# Patient Record
Sex: FEMALE | Race: WHITE | NOT HISPANIC OR LATINO | Employment: FULL TIME | URBAN - METROPOLITAN AREA
[De-identification: names, ages, dates, MRNs, and addresses within clinical notes are randomized per-mention and may not be internally consistent; named-entity substitution may affect disease eponyms.]

---

## 2024-06-04 ENCOUNTER — OFFICE VISIT (OUTPATIENT)
Dept: SURGERY | Facility: CLINIC | Age: 24
End: 2024-06-04
Payer: COMMERCIAL

## 2024-06-04 VITALS
SYSTOLIC BLOOD PRESSURE: 115 MMHG | WEIGHT: 200 LBS | HEART RATE: 72 BPM | RESPIRATION RATE: 16 BRPM | DIASTOLIC BLOOD PRESSURE: 73 MMHG | HEIGHT: 63 IN | BODY MASS INDEX: 35.44 KG/M2

## 2024-06-04 DIAGNOSIS — L08.9 INFECTED SEBACEOUS CYST: Primary | ICD-10-CM

## 2024-06-04 DIAGNOSIS — L72.3 INFECTED SEBACEOUS CYST: Primary | ICD-10-CM

## 2024-06-04 PROCEDURE — 87205 SMEAR GRAM STAIN: CPT | Performed by: SURGERY

## 2024-06-04 PROCEDURE — 87070 CULTURE OTHR SPECIMN AEROBIC: CPT | Performed by: SURGERY

## 2024-06-04 PROCEDURE — 10060 I&D ABSCESS SIMPLE/SINGLE: CPT | Performed by: SURGERY

## 2024-06-04 PROCEDURE — 87075 CULTR BACTERIA EXCEPT BLOOD: CPT | Performed by: SURGERY

## 2024-06-04 PROCEDURE — 87076 CULTURE ANAEROBE IDENT EACH: CPT | Performed by: SURGERY

## 2024-06-04 NOTE — PROGRESS NOTES
Preoperative diagnosis:  Diagnoses and all orders for this visit:    1. Infected sebaceous cyst (Primary)         Postoperative diagnosis:  Diagnoses and all orders for this visit:    1. Infected sebaceous cyst (Primary)         Procedure:  Incision and drainage     Surgeon:  Fam Cornelius M.D.     Anesthesia:  2 ml  Lidocaine     Assistant:  Lidia GALDAMEZ      EBL:  Minimal     Complications:  None     Specimen:  Fluid for cultures     Indications: 24 y.o. female referred by CLIFF Edouard after she was seen in a urgent care center for a painful, red, swollen area at her left upper chest.  She has  always had a small cyst at this area.  The changes mentioned occurred over the past few weeks.  She was placed on antibiotics and there was no improvement.  On exam, there is swelling, erythema, tenderness of the left upper chest.  Exam characteristics consistent with an infected sebaceous cyst.    Findings:  Purulent material drained from the skin and subcutaneous tissue.  Cyst capsule along with cyst contents was extracted.     Technique: Patient was taken to the procedure room and placed appropriately on the procedure table.  Consent had already been obtained.  The left upper chest at the area of concern was prepped and draped in usual fashion.  3 mL of lidocaine was injected into the skin.  An 11 blade knife was used to fashion an incision about one cm in length and about a small amount of purulent material drained.  A sample of the fluid was collected and sent for cultures.  I was able to extract the cyst along with the cyst capsule as well.  The wound was irrigated and an appropriate dressing was placed.  Adequate hemostasis.  No complications.     Follow-up:  Wound care instructions were provided verbally.  Patient will follow-up with me PRN.      Fam Cornelius MD  06/04/2024    Electronically signed by Fam Cornelius MD, 06/04/24, 10:07 AM EDT.

## 2024-06-07 LAB
BACTERIA SPEC AEROBE CULT: NORMAL
GRAM STN SPEC: NORMAL
GRAM STN SPEC: NORMAL

## 2024-06-09 LAB — BACTERIA SPEC ANAEROBE CULT: ABNORMAL

## 2025-03-14 ENCOUNTER — ANESTHESIA (OUTPATIENT)
Dept: PERIOP | Facility: HOSPITAL | Age: 25
End: 2025-03-14
Payer: COMMERCIAL

## 2025-03-14 ENCOUNTER — ANESTHESIA EVENT (OUTPATIENT)
Dept: PERIOP | Facility: HOSPITAL | Age: 25
End: 2025-03-14
Payer: COMMERCIAL

## 2025-03-14 ENCOUNTER — HOSPITAL ENCOUNTER (OUTPATIENT)
Facility: HOSPITAL | Age: 25
Discharge: HOME | End: 2025-03-16
Attending: INTERNAL MEDICINE | Admitting: SURGERY
Payer: COMMERCIAL

## 2025-03-14 DIAGNOSIS — K81.9 CHOLECYSTITIS: Primary | ICD-10-CM

## 2025-03-14 LAB
ABO GROUP BLD: NORMAL
ABO GROUP BLD: NORMAL
ALBUMIN SERPL-MCNC: 3.7 G/DL (ref 3.5–5.2)
ALBUMIN/GLOB SERPL: 1 G/DL
ALP SERPL-CCNC: 71 U/L (ref 39–117)
ALT SERPL W P-5'-P-CCNC: 19 U/L (ref 1–33)
ANION GAP SERPL CALCULATED.3IONS-SCNC: 9.4 MMOL/L (ref 5–15)
APTT PPP: 28.9 SECONDS (ref 24.2–34.2)
AST SERPL-CCNC: 19 U/L (ref 1–32)
B-HCG UR QL: NEGATIVE
BACTERIA UR QL AUTO: ABNORMAL /HPF
BASOPHILS # BLD AUTO: 0.03 10*3/MM3 (ref 0–0.2)
BASOPHILS NFR BLD AUTO: 0.3 % (ref 0–1.5)
BILIRUB SERPL-MCNC: 0.6 MG/DL (ref 0–1.2)
BILIRUB UR QL STRIP: NEGATIVE
BLD GP AB SCN SERPL QL: NEGATIVE
BUN SERPL-MCNC: 8 MG/DL (ref 6–20)
BUN/CREAT SERPL: 12.9 (ref 7–25)
CALCIUM SPEC-SCNC: 8.9 MG/DL (ref 8.6–10.5)
CHLORIDE SERPL-SCNC: 105 MMOL/L (ref 98–107)
CLARITY UR: CLEAR
CO2 SERPL-SCNC: 23.6 MMOL/L (ref 22–29)
COLOR UR: YELLOW
CREAT SERPL-MCNC: 0.62 MG/DL (ref 0.57–1)
D-LACTATE SERPL-SCNC: 0.6 MMOL/L (ref 0.5–2)
DEPRECATED RDW RBC AUTO: 40.9 FL (ref 37–54)
EGFRCR SERPLBLD CKD-EPI 2021: 126.9 ML/MIN/1.73
EOSINOPHIL # BLD AUTO: 0.1 10*3/MM3 (ref 0–0.4)
EOSINOPHIL NFR BLD AUTO: 0.9 % (ref 0.3–6.2)
ERYTHROCYTE [DISTWIDTH] IN BLOOD BY AUTOMATED COUNT: 12.7 % (ref 12.3–15.4)
GLOBULIN UR ELPH-MCNC: 3.6 GM/DL
GLUCOSE SERPL-MCNC: 81 MG/DL (ref 65–99)
GLUCOSE UR STRIP-MCNC: NEGATIVE MG/DL
HCT VFR BLD AUTO: 37 % (ref 34–46.6)
HGB BLD-MCNC: 12.2 G/DL (ref 12–15.9)
HGB UR QL STRIP.AUTO: NEGATIVE
HOLD SPECIMEN: NORMAL
HYALINE CASTS UR QL AUTO: ABNORMAL /LPF
IMM GRANULOCYTES # BLD AUTO: 0.03 10*3/MM3 (ref 0–0.05)
IMM GRANULOCYTES NFR BLD AUTO: 0.3 % (ref 0–0.5)
INR PPP: 1.05 (ref 0.86–1.15)
KETONES UR QL STRIP: ABNORMAL
LEUKOCYTE ESTERASE UR QL STRIP.AUTO: ABNORMAL
LIPASE SERPL-CCNC: 18 U/L (ref 13–60)
LYMPHOCYTES # BLD AUTO: 2.67 10*3/MM3 (ref 0.7–3.1)
LYMPHOCYTES NFR BLD AUTO: 22.8 % (ref 19.6–45.3)
MAGNESIUM SERPL-MCNC: 1.7 MG/DL (ref 1.6–2.6)
MCH RBC QN AUTO: 29 PG (ref 26.6–33)
MCHC RBC AUTO-ENTMCNC: 33 G/DL (ref 31.5–35.7)
MCV RBC AUTO: 87.9 FL (ref 79–97)
MONOCYTES # BLD AUTO: 0.97 10*3/MM3 (ref 0.1–0.9)
MONOCYTES NFR BLD AUTO: 8.3 % (ref 5–12)
NEUTROPHILS NFR BLD AUTO: 67.4 % (ref 42.7–76)
NEUTROPHILS NFR BLD AUTO: 7.93 10*3/MM3 (ref 1.7–7)
NITRITE UR QL STRIP: NEGATIVE
NRBC BLD AUTO-RTO: 0 /100 WBC (ref 0–0.2)
PH UR STRIP.AUTO: 7 [PH] (ref 5–8)
PHOSPHATE SERPL-MCNC: 2.9 MG/DL (ref 2.5–4.5)
PLATELET # BLD AUTO: 234 10*3/MM3 (ref 140–450)
PMV BLD AUTO: 9.9 FL (ref 6–12)
POTASSIUM SERPL-SCNC: 3.5 MMOL/L (ref 3.5–5.2)
PROT SERPL-MCNC: 7.3 G/DL (ref 6–8.5)
PROT UR QL STRIP: NEGATIVE
PROTHROMBIN TIME: 14.2 SECONDS (ref 11.8–14.9)
RBC # BLD AUTO: 4.21 10*6/MM3 (ref 3.77–5.28)
RBC # UR STRIP: ABNORMAL /HPF
REF LAB TEST METHOD: ABNORMAL
RH BLD: NEGATIVE
RH BLD: NEGATIVE
SODIUM SERPL-SCNC: 138 MMOL/L (ref 136–145)
SP GR UR STRIP: 1.02 (ref 1–1.03)
SQUAMOUS #/AREA URNS HPF: ABNORMAL /HPF
T&S EXPIRATION DATE: NORMAL
UROBILINOGEN UR QL STRIP: ABNORMAL
WBC # UR STRIP: ABNORMAL /HPF
WBC NRBC COR # BLD AUTO: 11.73 10*3/MM3 (ref 3.4–10.8)

## 2025-03-14 PROCEDURE — 88304 TISSUE EXAM BY PATHOLOGIST: CPT | Performed by: SURGERY

## 2025-03-14 PROCEDURE — 25010000002 INDOCYANINE GREEN 25 MG RECONSTITUTED SOLUTION: Performed by: SURGERY

## 2025-03-14 PROCEDURE — 83605 ASSAY OF LACTIC ACID: CPT | Performed by: HOSPITALIST

## 2025-03-14 PROCEDURE — 25010000002 PROPOFOL 10 MG/ML EMULSION: Performed by: NURSE ANESTHETIST, CERTIFIED REGISTERED

## 2025-03-14 PROCEDURE — 84100 ASSAY OF PHOSPHORUS: CPT | Performed by: HOSPITALIST

## 2025-03-14 PROCEDURE — 25010000002 LIDOCAINE PF 2% 2 % SOLUTION: Performed by: NURSE ANESTHETIST, CERTIFIED REGISTERED

## 2025-03-14 PROCEDURE — 83735 ASSAY OF MAGNESIUM: CPT | Performed by: HOSPITALIST

## 2025-03-14 PROCEDURE — 25010000002 FENTANYL CITRATE (PF) 50 MCG/ML SOLUTION: Performed by: NURSE ANESTHETIST, CERTIFIED REGISTERED

## 2025-03-14 PROCEDURE — G0378 HOSPITAL OBSERVATION PER HR: HCPCS

## 2025-03-14 PROCEDURE — 99204 OFFICE O/P NEW MOD 45 MIN: CPT | Performed by: SURGERY

## 2025-03-14 PROCEDURE — 25010000002 CEFOXITIN PER 1 G: Performed by: SURGERY

## 2025-03-14 PROCEDURE — 86901 BLOOD TYPING SEROLOGIC RH(D): CPT | Performed by: HOSPITALIST

## 2025-03-14 PROCEDURE — 86850 RBC ANTIBODY SCREEN: CPT | Performed by: HOSPITALIST

## 2025-03-14 PROCEDURE — 25810000003 LACTATED RINGERS PER 1000 ML: Performed by: ANESTHESIOLOGY

## 2025-03-14 PROCEDURE — 25010000002 MIDAZOLAM PER 1MG: Performed by: ANESTHESIOLOGY

## 2025-03-14 PROCEDURE — 81001 URINALYSIS AUTO W/SCOPE: CPT | Performed by: HOSPITALIST

## 2025-03-14 PROCEDURE — 85025 COMPLETE CBC W/AUTO DIFF WBC: CPT | Performed by: HOSPITALIST

## 2025-03-14 PROCEDURE — 83690 ASSAY OF LIPASE: CPT | Performed by: HOSPITALIST

## 2025-03-14 PROCEDURE — 81025 URINE PREGNANCY TEST: CPT | Performed by: SURGERY

## 2025-03-14 PROCEDURE — 85730 THROMBOPLASTIN TIME PARTIAL: CPT | Performed by: HOSPITALIST

## 2025-03-14 PROCEDURE — 25010000002 CEFAZOLIN PER 500 MG: Performed by: NURSE ANESTHETIST, CERTIFIED REGISTERED

## 2025-03-14 PROCEDURE — 86900 BLOOD TYPING SEROLOGIC ABO: CPT

## 2025-03-14 PROCEDURE — 85610 PROTHROMBIN TIME: CPT | Performed by: HOSPITALIST

## 2025-03-14 PROCEDURE — 86901 BLOOD TYPING SEROLOGIC RH(D): CPT

## 2025-03-14 PROCEDURE — 80053 COMPREHEN METABOLIC PANEL: CPT | Performed by: HOSPITALIST

## 2025-03-14 PROCEDURE — 47563 LAPARO CHOLECYSTECTOMY/GRAPH: CPT | Performed by: SURGERY

## 2025-03-14 PROCEDURE — 25010000002 KETOROLAC TROMETHAMINE PER 15 MG: Performed by: NURSE ANESTHETIST, CERTIFIED REGISTERED

## 2025-03-14 PROCEDURE — 25010000002 DEXAMETHASONE PER 1 MG: Performed by: NURSE ANESTHETIST, CERTIFIED REGISTERED

## 2025-03-14 PROCEDURE — 25010000002 SUGAMMADEX 200 MG/2ML SOLUTION: Performed by: NURSE ANESTHETIST, CERTIFIED REGISTERED

## 2025-03-14 PROCEDURE — 25010000002 HYDROMORPHONE 1 MG/ML SOLUTION: Performed by: NURSE ANESTHETIST, CERTIFIED REGISTERED

## 2025-03-14 PROCEDURE — 94799 UNLISTED PULMONARY SVC/PX: CPT

## 2025-03-14 PROCEDURE — 86900 BLOOD TYPING SEROLOGIC ABO: CPT | Performed by: HOSPITALIST

## 2025-03-14 PROCEDURE — 25010000002 BUPIVACAINE (PF) 0.25 % SOLUTION: Performed by: SURGERY

## 2025-03-14 PROCEDURE — 25010000002 ONDANSETRON PER 1 MG: Performed by: NURSE ANESTHETIST, CERTIFIED REGISTERED

## 2025-03-14 DEVICE — MEDIUM-LARGE LIGATION CLIPS 6 CLIPS/CART
Type: IMPLANTABLE DEVICE | Site: ABDOMEN | Status: FUNCTIONAL
Brand: VAS-Q-CLIP

## 2025-03-14 RX ORDER — MIDAZOLAM HYDROCHLORIDE 2 MG/2ML
2 INJECTION, SOLUTION INTRAMUSCULAR; INTRAVENOUS ONCE
Status: COMPLETED | OUTPATIENT
Start: 2025-03-14 | End: 2025-03-14

## 2025-03-14 RX ORDER — SODIUM CHLORIDE 0.9 % (FLUSH) 0.9 %
10 SYRINGE (ML) INJECTION EVERY 12 HOURS SCHEDULED
Status: DISCONTINUED | OUTPATIENT
Start: 2025-03-14 | End: 2025-03-16 | Stop reason: HOSPADM

## 2025-03-14 RX ORDER — ACETAMINOPHEN 160 MG/5ML
650 SOLUTION ORAL EVERY 4 HOURS PRN
Status: DISCONTINUED | OUTPATIENT
Start: 2025-03-14 | End: 2025-03-16 | Stop reason: HOSPADM

## 2025-03-14 RX ORDER — SODIUM CHLORIDE, SODIUM LACTATE, POTASSIUM CHLORIDE, CALCIUM CHLORIDE 600; 310; 30; 20 MG/100ML; MG/100ML; MG/100ML; MG/100ML
100 INJECTION, SOLUTION INTRAVENOUS CONTINUOUS
Status: DISCONTINUED | OUTPATIENT
Start: 2025-03-14 | End: 2025-03-16 | Stop reason: HOSPADM

## 2025-03-14 RX ORDER — AMOXICILLIN 250 MG
2 CAPSULE ORAL 2 TIMES DAILY PRN
Status: DISCONTINUED | OUTPATIENT
Start: 2025-03-14 | End: 2025-03-15

## 2025-03-14 RX ORDER — SODIUM CHLORIDE 0.9 % (FLUSH) 0.9 %
10 SYRINGE (ML) INJECTION AS NEEDED
Status: DISCONTINUED | OUTPATIENT
Start: 2025-03-14 | End: 2025-03-14 | Stop reason: HOSPADM

## 2025-03-14 RX ORDER — BISACODYL 10 MG
10 SUPPOSITORY, RECTAL RECTAL DAILY PRN
Status: DISCONTINUED | OUTPATIENT
Start: 2025-03-14 | End: 2025-03-15

## 2025-03-14 RX ORDER — INDOCYANINE GREEN AND WATER 25 MG
2.5 KIT INJECTION ONCE
Status: COMPLETED | OUTPATIENT
Start: 2025-03-14 | End: 2025-03-14

## 2025-03-14 RX ORDER — CEFAZOLIN SODIUM 1 G/3ML
INJECTION, POWDER, FOR SOLUTION INTRAMUSCULAR; INTRAVENOUS AS NEEDED
Status: DISCONTINUED | OUTPATIENT
Start: 2025-03-14 | End: 2025-03-14 | Stop reason: SURG

## 2025-03-14 RX ORDER — DEXAMETHASONE SODIUM PHOSPHATE 4 MG/ML
INJECTION, SOLUTION INTRA-ARTICULAR; INTRALESIONAL; INTRAMUSCULAR; INTRAVENOUS; SOFT TISSUE AS NEEDED
Status: DISCONTINUED | OUTPATIENT
Start: 2025-03-14 | End: 2025-03-14 | Stop reason: SURG

## 2025-03-14 RX ORDER — LIDOCAINE HYDROCHLORIDE 20 MG/ML
INJECTION, SOLUTION EPIDURAL; INFILTRATION; INTRACAUDAL; PERINEURAL AS NEEDED
Status: DISCONTINUED | OUTPATIENT
Start: 2025-03-14 | End: 2025-03-14 | Stop reason: SURG

## 2025-03-14 RX ORDER — SODIUM CHLORIDE, SODIUM LACTATE, POTASSIUM CHLORIDE, CALCIUM CHLORIDE 600; 310; 30; 20 MG/100ML; MG/100ML; MG/100ML; MG/100ML
9 INJECTION, SOLUTION INTRAVENOUS CONTINUOUS PRN
Status: ACTIVE | OUTPATIENT
Start: 2025-03-14 | End: 2025-03-15

## 2025-03-14 RX ORDER — ONDANSETRON 2 MG/ML
INJECTION INTRAMUSCULAR; INTRAVENOUS AS NEEDED
Status: DISCONTINUED | OUTPATIENT
Start: 2025-03-14 | End: 2025-03-14 | Stop reason: SURG

## 2025-03-14 RX ORDER — ONDANSETRON 2 MG/ML
4 INJECTION INTRAMUSCULAR; INTRAVENOUS EVERY 6 HOURS PRN
Status: DISCONTINUED | OUTPATIENT
Start: 2025-03-14 | End: 2025-03-14 | Stop reason: SDUPTHER

## 2025-03-14 RX ORDER — NALOXONE HCL 0.4 MG/ML
0.4 VIAL (ML) INJECTION
Status: DISCONTINUED | OUTPATIENT
Start: 2025-03-14 | End: 2025-03-16 | Stop reason: HOSPADM

## 2025-03-14 RX ORDER — ACETAMINOPHEN 650 MG/1
650 SUPPOSITORY RECTAL EVERY 4 HOURS PRN
Status: DISCONTINUED | OUTPATIENT
Start: 2025-03-14 | End: 2025-03-16 | Stop reason: HOSPADM

## 2025-03-14 RX ORDER — SODIUM CHLORIDE 9 MG/ML
40 INJECTION, SOLUTION INTRAVENOUS AS NEEDED
Status: DISCONTINUED | OUTPATIENT
Start: 2025-03-14 | End: 2025-03-16 | Stop reason: HOSPADM

## 2025-03-14 RX ORDER — POLYETHYLENE GLYCOL 3350 17 G/17G
17 POWDER, FOR SOLUTION ORAL DAILY PRN
Status: DISCONTINUED | OUTPATIENT
Start: 2025-03-14 | End: 2025-03-15

## 2025-03-14 RX ORDER — ROCURONIUM BROMIDE 10 MG/ML
INJECTION, SOLUTION INTRAVENOUS AS NEEDED
Status: DISCONTINUED | OUTPATIENT
Start: 2025-03-14 | End: 2025-03-14 | Stop reason: SURG

## 2025-03-14 RX ORDER — SODIUM CHLORIDE 0.9 % (FLUSH) 0.9 %
10 SYRINGE (ML) INJECTION EVERY 12 HOURS SCHEDULED
Status: DISCONTINUED | OUTPATIENT
Start: 2025-03-14 | End: 2025-03-14 | Stop reason: HOSPADM

## 2025-03-14 RX ORDER — ACETAMINOPHEN 500 MG
1000 TABLET ORAL ONCE
Status: COMPLETED | OUTPATIENT
Start: 2025-03-14 | End: 2025-03-14

## 2025-03-14 RX ORDER — FENTANYL CITRATE 50 UG/ML
INJECTION, SOLUTION INTRAMUSCULAR; INTRAVENOUS AS NEEDED
Status: DISCONTINUED | OUTPATIENT
Start: 2025-03-14 | End: 2025-03-14 | Stop reason: SURG

## 2025-03-14 RX ORDER — ENOXAPARIN SODIUM 100 MG/ML
40 INJECTION SUBCUTANEOUS DAILY
Status: DISCONTINUED | OUTPATIENT
Start: 2025-03-14 | End: 2025-03-16 | Stop reason: HOSPADM

## 2025-03-14 RX ORDER — ONDANSETRON 2 MG/ML
4 INJECTION INTRAMUSCULAR; INTRAVENOUS EVERY 6 HOURS PRN
Status: DISCONTINUED | OUTPATIENT
Start: 2025-03-14 | End: 2025-03-16 | Stop reason: HOSPADM

## 2025-03-14 RX ORDER — PROMETHAZINE HYDROCHLORIDE 25 MG/1
25 TABLET ORAL ONCE AS NEEDED
Status: DISCONTINUED | OUTPATIENT
Start: 2025-03-14 | End: 2025-03-14 | Stop reason: HOSPADM

## 2025-03-14 RX ORDER — HYDROCODONE BITARTRATE AND ACETAMINOPHEN 5; 325 MG/1; MG/1
1 TABLET ORAL EVERY 4 HOURS PRN
Status: DISCONTINUED | OUTPATIENT
Start: 2025-03-14 | End: 2025-03-16 | Stop reason: HOSPADM

## 2025-03-14 RX ORDER — ONDANSETRON 2 MG/ML
4 INJECTION INTRAMUSCULAR; INTRAVENOUS ONCE AS NEEDED
Status: DISCONTINUED | OUTPATIENT
Start: 2025-03-14 | End: 2025-03-14 | Stop reason: HOSPADM

## 2025-03-14 RX ORDER — OXYCODONE HYDROCHLORIDE 5 MG/1
5 TABLET ORAL EVERY 6 HOURS PRN
Refills: 0 | Status: DISCONTINUED | OUTPATIENT
Start: 2025-03-14 | End: 2025-03-16 | Stop reason: HOSPADM

## 2025-03-14 RX ORDER — SCOPOLAMINE 1 MG/3D
1 PATCH, EXTENDED RELEASE TRANSDERMAL ONCE
Status: COMPLETED | OUTPATIENT
Start: 2025-03-14 | End: 2025-03-15

## 2025-03-14 RX ORDER — ACETAMINOPHEN 325 MG/1
650 TABLET ORAL EVERY 4 HOURS PRN
Status: DISCONTINUED | OUTPATIENT
Start: 2025-03-14 | End: 2025-03-14 | Stop reason: SDUPTHER

## 2025-03-14 RX ORDER — SODIUM CHLORIDE 0.9 % (FLUSH) 0.9 %
10 SYRINGE (ML) INJECTION AS NEEDED
Status: DISCONTINUED | OUTPATIENT
Start: 2025-03-14 | End: 2025-03-16 | Stop reason: HOSPADM

## 2025-03-14 RX ORDER — TRAMADOL HYDROCHLORIDE 50 MG/1
50 TABLET ORAL EVERY 6 HOURS PRN
Status: DISCONTINUED | OUTPATIENT
Start: 2025-03-14 | End: 2025-03-16 | Stop reason: HOSPADM

## 2025-03-14 RX ORDER — SODIUM CHLORIDE 9 MG/ML
40 INJECTION, SOLUTION INTRAVENOUS AS NEEDED
Status: DISCONTINUED | OUTPATIENT
Start: 2025-03-14 | End: 2025-03-14 | Stop reason: HOSPADM

## 2025-03-14 RX ORDER — BISACODYL 5 MG/1
5 TABLET, DELAYED RELEASE ORAL DAILY PRN
Status: DISCONTINUED | OUTPATIENT
Start: 2025-03-14 | End: 2025-03-15

## 2025-03-14 RX ORDER — DOCUSATE SODIUM 100 MG/1
100 CAPSULE, LIQUID FILLED ORAL 2 TIMES DAILY PRN
Status: DISCONTINUED | OUTPATIENT
Start: 2025-03-14 | End: 2025-03-16 | Stop reason: HOSPADM

## 2025-03-14 RX ORDER — KETOROLAC TROMETHAMINE 30 MG/ML
INJECTION, SOLUTION INTRAMUSCULAR; INTRAVENOUS AS NEEDED
Status: DISCONTINUED | OUTPATIENT
Start: 2025-03-14 | End: 2025-03-14 | Stop reason: SURG

## 2025-03-14 RX ORDER — PROMETHAZINE HYDROCHLORIDE 25 MG/1
25 SUPPOSITORY RECTAL ONCE AS NEEDED
Status: DISCONTINUED | OUTPATIENT
Start: 2025-03-14 | End: 2025-03-14 | Stop reason: HOSPADM

## 2025-03-14 RX ORDER — MAGNESIUM HYDROXIDE 1200 MG/15ML
LIQUID ORAL AS NEEDED
Status: DISCONTINUED | OUTPATIENT
Start: 2025-03-14 | End: 2025-03-14 | Stop reason: HOSPADM

## 2025-03-14 RX ORDER — OXYCODONE HYDROCHLORIDE 5 MG/1
5 TABLET ORAL
Status: DISCONTINUED | OUTPATIENT
Start: 2025-03-14 | End: 2025-03-14 | Stop reason: HOSPADM

## 2025-03-14 RX ORDER — ACETAMINOPHEN 650 MG/1
650 SUPPOSITORY RECTAL EVERY 4 HOURS PRN
Status: DISCONTINUED | OUTPATIENT
Start: 2025-03-14 | End: 2025-03-14 | Stop reason: SDUPTHER

## 2025-03-14 RX ORDER — ACETAMINOPHEN 325 MG/1
650 TABLET ORAL EVERY 4 HOURS PRN
Status: DISCONTINUED | OUTPATIENT
Start: 2025-03-14 | End: 2025-03-16 | Stop reason: HOSPADM

## 2025-03-14 RX ORDER — PROPOFOL 10 MG/ML
VIAL (ML) INTRAVENOUS AS NEEDED
Status: DISCONTINUED | OUTPATIENT
Start: 2025-03-14 | End: 2025-03-14 | Stop reason: SURG

## 2025-03-14 RX ORDER — ONDANSETRON 4 MG/1
4 TABLET, ORALLY DISINTEGRATING ORAL EVERY 6 HOURS PRN
Status: DISCONTINUED | OUTPATIENT
Start: 2025-03-14 | End: 2025-03-16 | Stop reason: HOSPADM

## 2025-03-14 RX ORDER — BUPIVACAINE HYDROCHLORIDE 2.5 MG/ML
INJECTION, SOLUTION EPIDURAL; INFILTRATION; INTRACAUDAL AS NEEDED
Status: DISCONTINUED | OUTPATIENT
Start: 2025-03-14 | End: 2025-03-14 | Stop reason: HOSPADM

## 2025-03-14 RX ORDER — KETOROLAC TROMETHAMINE 15 MG/ML
15 INJECTION, SOLUTION INTRAMUSCULAR; INTRAVENOUS EVERY 6 HOURS PRN
Status: DISCONTINUED | OUTPATIENT
Start: 2025-03-14 | End: 2025-03-16 | Stop reason: HOSPADM

## 2025-03-14 RX ADMIN — ONDANSETRON 4 MG: 2 INJECTION INTRAMUSCULAR; INTRAVENOUS at 18:14

## 2025-03-14 RX ADMIN — SCOLOPAMINE TRANSDERMAL SYSTEM 1 PATCH: 1 PATCH, EXTENDED RELEASE TRANSDERMAL at 16:48

## 2025-03-14 RX ADMIN — SODIUM CHLORIDE 2000 MG: 9 INJECTION, SOLUTION INTRAVENOUS at 22:33

## 2025-03-14 RX ADMIN — SODIUM CHLORIDE, POTASSIUM CHLORIDE, SODIUM LACTATE AND CALCIUM CHLORIDE 9 ML/HR: 600; 310; 30; 20 INJECTION, SOLUTION INTRAVENOUS at 17:44

## 2025-03-14 RX ADMIN — HYDROMORPHONE HYDROCHLORIDE 0.5 MG: 1 INJECTION, SOLUTION INTRAMUSCULAR; INTRAVENOUS; SUBCUTANEOUS at 19:01

## 2025-03-14 RX ADMIN — CEFAZOLIN 2 G: 1 INJECTION, POWDER, FOR SOLUTION INTRAMUSCULAR; INTRAVENOUS; PARENTERAL at 18:03

## 2025-03-14 RX ADMIN — SODIUM CHLORIDE, POTASSIUM CHLORIDE, SODIUM LACTATE AND CALCIUM CHLORIDE: 600; 310; 30; 20 INJECTION, SOLUTION INTRAVENOUS at 19:19

## 2025-03-14 RX ADMIN — ACETAMINOPHEN 1000 MG: 500 TABLET ORAL at 16:48

## 2025-03-14 RX ADMIN — KETOROLAC TROMETHAMINE 30 MG: 30 INJECTION, SOLUTION INTRAMUSCULAR; INTRAVENOUS at 19:08

## 2025-03-14 RX ADMIN — INDOCYANINE GREEN AND WATER 2.5 MG: KIT at 17:44

## 2025-03-14 RX ADMIN — HYDROMORPHONE HYDROCHLORIDE 0.5 MG: 1 INJECTION, SOLUTION INTRAMUSCULAR; INTRAVENOUS; SUBCUTANEOUS at 19:11

## 2025-03-14 RX ADMIN — MIDAZOLAM HYDROCHLORIDE 2 MG: 1 INJECTION, SOLUTION INTRAMUSCULAR; INTRAVENOUS at 17:44

## 2025-03-14 RX ADMIN — LIDOCAINE HYDROCHLORIDE 80 MG: 20 INJECTION, SOLUTION INTRAVENOUS at 17:56

## 2025-03-14 RX ADMIN — FENTANYL CITRATE 100 MCG: 50 INJECTION, SOLUTION INTRAMUSCULAR; INTRAVENOUS at 17:56

## 2025-03-14 RX ADMIN — ROCURONIUM BROMIDE 100 MG: 10 INJECTION, SOLUTION INTRAVENOUS at 17:56

## 2025-03-14 RX ADMIN — DEXAMETHASONE SODIUM PHOSPHATE 4 MG: 4 INJECTION, SOLUTION INTRAMUSCULAR; INTRAVENOUS at 18:14

## 2025-03-14 RX ADMIN — SUGAMMADEX 200 MG: 100 INJECTION, SOLUTION INTRAVENOUS at 19:08

## 2025-03-14 RX ADMIN — PROPOFOL 200 MG: 10 INJECTION, EMULSION INTRAVENOUS at 17:56

## 2025-03-14 NOTE — H&P
24-year-old female with no past medical history who presented to [facility] ER today with right upper quadrant abdominal pain and nausea.  No diarrhea, hematochezia, vomiting.  Initially, AST, ALT, alk phos, bili were within normal limits.  WBC 13,000.  Vital signs normal, no evidence of sepsis.  Imaging concerning for cholecystitis with gallstones in the gallbladder neck.  No evidence of choledocholithiasis or biliary ductal dilatation.  The case was discussed with Dr. Muhammad who recommends transfer for cholecystectomy.  Recommend reach out to Dr. Muhammad upon arrival.

## 2025-03-14 NOTE — H&P
[facility]  [facility]   Patient Name: Rosa Bhat  : 2000  MRN: [id number]  Primary Care Physician:  Marisol, No Known  Date of admission: 3/14/2025    Subjective   Subjective     Chief Complaint: abdominal pain     HPI:    Rosa Bhat is a 24 y.o. female with no PMH presented as a transfer from OSH for right upper quadrant abdominal pain. Patient is here with her family at bedside.  Patient states that 4 days back is when the pain started after dinner. She had sudden, pressure like right upper quadrant pain radiating to her back, associated with nausea. The pain subsided without any intervention that day. Over the course of entire week, abdominal pain recurred with increasing intensity with worst being yesterday night after dinner. It was associated with NBNB vomiting. She did try taking NSAIDs without much relief. Denies any fever/chills, no diarrhea, hematochezia, hematemesis.     She presented to the OSH where CT abdomen showed cholecystitis with gallstones in the GB neck. No evidence of choledocholithiasis or biliary ductal dilatation. Initial AST, ALT, alk phos, bili were within normal limits. WBC 13,000. Vital signs normal, no evidence of sepsis. On call Surgery was consulted and patient transferred here for further management.   OSH Records are not accessible via Epic.     Personal History     Past Medical History:  History reviewed. No pertinent past medical history.    Past Surgical History:  History reviewed. No pertinent surgical history.    Family History:   Family History   Problem Relation Age of Onset    Hypertension Mother     Kidney disease Mother     Hypertension Father     Diabetes Maternal Grandfather     Alcohol abuse Paternal Grandfather     Kidney disease Sister        Social History:   Social History     Socioeconomic History    Marital status: Single   Tobacco Use    Smoking status: Never    Smokeless tobacco: Never   Vaping Use     Vaping status: Never Used   Substance and Sexual Activity    Alcohol use: Never    Drug use: Never    Sexual activity: Never       Home Medications:   none    Allergies:  Allergies   Allergen Reactions    Tuberculin Tests Other (See Comments)     Reacts as if she has TB when she does not and usually has to do the blood test as well        Review of Systems   All systems were reviewed and negative except for as mentioned in HPI    Objective   Objective     Vitals:   Temp:  [97.8 °F (36.6 °C)] 97.8 °F (36.6 °C)  Heart Rate:  [69] 69  Resp:  [16] 16  BP: (109)/(65) 109/65    Physical Exam    Constitutional: Awake, alert, no acute distress   Eyes: Pupils equal, sclerae anicteric, no conjunctival injection   HENT: NCAT, mucous membranes moist   Neck: Supple, no thyromegaly, no lymphadenopathy, trachea midline   Respiratory: Clear to auscultation bilaterally, nonlabored respirations    Cardiovascular: RRR, no murmurs, rubs, or gallops, palpable pedal pulses bilaterally   Gastrointestinal: Positive bowel sounds, soft, nontender, nondistended   Musculoskeletal: No bilateral ankle edema, no clubbing or cyanosis to extremities   Psychiatric: Appropriate affect, cooperative   Neurologic: Oriented x 3, strength symmetric in all extremities, Cranial Nerves grossly intact to confrontation, speech clear   Skin: No rashes     Result Review    Result Review:  reviewed the verbal results from the time of this admission   [x]  Laboratory  []  Microbiology  [x]  Radiology  []  EKG/Telemetry   []  Cardiology/Vascular   []  Pathology  []  Old records  []  Other:      Assessment & Plan   Assessment / Plan     Assessment/Plan:   #Gallstone cholecystitis  - afebrile and hemodynamically stable  - not septic  Plan:  - Repeat labs and Bcx  - Iv zosyn en-route to procedure  - mIVF ordered, prn Zofran and pain meds  - Surgery input noted, Will keep NPO for lap zohaib -patient agreeable.      VTE Prophylaxis:  Mechanical VTE prophylaxis orders  are signed & held. Pharmacologic VTE prophylaxis orders are present.        CODE STATUS:    Code Status (Patient has no pulse and is not breathing): CPR (Attempt to Resuscitate)  Medical Interventions (Patient has pulse or is breathing): Full Support  Level Of Support Discussed With: Patient      Admission Status:  I believe this patient meets observation status.    Electronically signed by Anyi Hawk MD, 03/14/25, 3:40 PM EDT.

## 2025-03-14 NOTE — ANESTHESIA PREPROCEDURE EVALUATION
Anesthesia Evaluation     Patient summary reviewed and Nursing notes reviewed   no history of anesthetic complications:   NPO Solid Status: > 8 hours  NPO Liquid Status: > 2 hours           Airway   Mallampati: I  TM distance: >3 FB  Neck ROM: full  Dental - normal exam     Pulmonary - negative pulmonary ROS and normal exam   Cardiovascular - negative cardio ROS and normal exam  Exercise tolerance: good (4-7 METS)        Neuro/Psych- negative ROS  GI/Hepatic/Renal/Endo    (+) obesity    Musculoskeletal (-) negative ROS    Abdominal   (+) obese   Substance History - negative use     OB/GYN negative ob/gyn ROS         Other - negative ROS       ROS/Med Hx Other: PAT Nursing Notes unavailable.               Anesthesia Plan    ASA 2     general     intravenous induction     Anesthetic plan, risks, benefits, and alternatives have been provided, discussed and informed consent has been obtained with: patient.    Plan discussed with CRNA.    CODE STATUS:    Code Status (Patient has no pulse and is not breathing): CPR (Attempt to Resuscitate)  Medical Interventions (Patient has pulse or is breathing): Full Support  Level Of Support Discussed With: Patient

## 2025-03-14 NOTE — OP NOTE
CHOLECYSTECTOMY LAPAROSCOPIC WITH DAVINCI ROBOT  Procedure Report    Patient Name:  Rosa Bhat  YOB: 2000    Date of Surgery:  3/14/2025     Indications: The patient is a 24-year-old female that was referred here from [facility] earlier today with gallstones and cholecystitis.  The decision was made to proceed with a robotic cholecystectomy.    Pre-op Diagnosis: Gallstones and acute cholecystitis    Post-Op Diagnosis: Same    Procedure/CPT® Codes:    Robotic cholecystectomy    Staff:  Surgeon(s):  Ryne Muhammad MD    Assistant: Renzo Paniagua    Anesthesia: General    Estimated Blood Loss: 10 mL    Implants:    Implant Name Type Inv. Item Serial No.  Lot No. LRB No. Used Action   CARTRDG CLIP LIGAT VASQCLIP [id number] MD/LG STRL - [id number] Implant CARTRDG CLIP LIGAT VASQCLIP [id number] MD/LG STRL  [employer]SI[employer] [id number] N/A 1 Implanted       Specimen:          Specimens       ID Source Type Tests Collected By Collected At Frozen?    A Gallbladder Tissue TISSUE PATHOLOGY EXAM   Ryne Muhammad MD 3/14/25 3036                 Findings: Gallstones and cholecystitis    Complications: None    Description of Procedure: The patient was taken the operating room and placed on the table in supine position.  After induction of general anesthesia, the abdomen was prepped and draped sterilely.  The abdomen was insufflated with a Veress needle and a 5 mm right flank port was placed in the peritoneal cavity using a Visiport.  Two 8 mm da Franki robotic trocars and one 12 mm da Franki robotic trocar were then placed in the abdomen.  The da Franki robot was brought to the table and the robotic arms were docked to the trocars.  The camera and instruments were then placed in the abdomen.  The gallbladder was identified and grasped by the assistant and retracted cephalad.  I then dissected out the gallbladder cystic duct junction and obtained a critical view of  safety.  The camera was switched over to firefly mode and we clearly saw the cystic duct coming up into the infundibulum of the gallbladder and identified the common bile duct as well.  The cystic duct was clipped twice proximally once distally with Hem-o-joslyn clips and then divided with the cutting current of the cautery.  We then dissected the cystic artery and this was clipped twice proximally with Hem-o-joslyn clips and divided distally with cautery.  We then dissected the gallbladder free from the gallbladder fossa with cautery and placed the detached gallbladder in an Endopouch bag.  The operative field was irrigated with sterile saline and suctioned dry.  We appear to have good hemostasis.  At this point remove the instruments from the abdomen and undocked the robotic arms from the trocars.  The gallbladder was removed from the abdomen and the 12 mm port site was closed with a Pablito-Shannon closure device and a 0 Mersilene tie.  After desufflated the abdomen the other ports were removed.  All skin incisions were closed with 4-0 Vicryl subcuticular sutures.  Sterile dressings were then applied.  She was taken the postanesthesia recovery room in stable condition.    Assistant: Renzo Paniagua  was responsible for performing the following activities: Retraction, Suction, Irrigation, Placing Dressing, and Held/Positioned Camera and their skilled assistance was necessary for the success of this case.    Ryne Muhammad MD     Date: 3/14/2025  Time: 19:20 EDT

## 2025-03-14 NOTE — PLAN OF CARE
Goal Outcome Evaluation:  Plan of Care Reviewed With: patient        Progress: no change  Outcome Evaluation: patient was a direct admit from [place] today for cholecystitis, admission done, oriented to room and surroundings, 22 gauge noted to left ac, site free from redness or bleeding, tele secure and being monitored, mother and father at bedside,  patient was taken to surgery after arriving to floor, will continue with plan of care

## 2025-03-14 NOTE — CONSULTS
[facility]   Consult Note    Patient Name: Rosa Bhat  : 2000  MRN: [id number]  Primary Care Physician:  Provider, No Known  Referring Physician: No Known Provider  Date of admission: 3/14/2025    Consults  Subjective   Subjective     Reason for Consult/ Chief Complaint: Gallstones    History of Present Illness  Rosa Bhat is a 24 y.o. female who presents with symptomatic gallstones.  She initially presented to an [facility] hospital where she was noted on CT scan to have evidence of gallstones.    Review of Systems   Respiratory:  Negative for shortness of breath.    Cardiovascular:  Negative for chest pain.   Gastrointestinal:  Positive for abdominal pain.        Personal History     History reviewed. No pertinent past medical history.    History reviewed. No pertinent surgical history.    Family History: family history includes Alcohol abuse in her paternal grandfather; Diabetes in her maternal grandfather; Hypertension in her father and mother; Kidney disease in her mother and sister. Otherwise pertinent FHx was reviewed and not pertinent to current issue.    Social History:  reports that she has never smoked. She has never used smokeless tobacco. She reports that she does not drink alcohol and does not use drugs.    Home Medications:        Allergies:  Allergies   Allergen Reactions    Tuberculin Tests Other (See Comments)     Reacts as if she has TB when she does not and usually has to do the blood test as well        Objective    Objective     Vitals:  Temp:  [97.8 °F (36.6 °C)] 97.8 °F (36.6 °C)  Heart Rate:  [69] 69  Resp:  [16] 16  BP: (109)/(65) 109/65    Physical Exam  HENT:      Head: Normocephalic.   Cardiovascular:      Rate and Rhythm: Normal rate.   Pulmonary:      Effort: Pulmonary effort is normal.   Abdominal:      Palpations: Abdomen is soft.   Musculoskeletal:         General: Normal range of motion.      Cervical back: Normal range of motion.   Skin:     General: Skin is  warm.   Neurological:      General: No focal deficit present.      Mental Status: She is alert.   Psychiatric:         Mood and Affect: Mood normal.         Result Review    Result Review:  I have personally reviewed the results from the time of this admission to 3/14/2025 15:14 EDT and agree with these findings:  []  Laboratory list / accordion  []  Microbiology  []  Radiology  []  EKG/Telemetry   []  Cardiology/Vascular   []  Pathology  []  Old records  []  Other:  Most notable findings include:       Assessment & Plan   Assessment / Plan     Brief Patient Summary:  Rosa Bhat is a 24 y.o. female who presents with acute gallstones and cholecystitis    Active Hospital Problems:  Active Hospital Problems    Diagnosis     **Cholecystitis      Plan: We will proceed with a robotic cholecystectomy.  I have described the procedure to her as well as the risk and benefits and she is agreeable to proceeding.      Ryne Muhammad MD

## 2025-03-15 LAB
ALBUMIN SERPL-MCNC: 3.6 G/DL (ref 3.5–5.2)
ALBUMIN/GLOB SERPL: 1 G/DL
ALP SERPL-CCNC: 67 U/L (ref 39–117)
ALT SERPL W P-5'-P-CCNC: 30 U/L (ref 1–33)
ANION GAP SERPL CALCULATED.3IONS-SCNC: 10.1 MMOL/L (ref 5–15)
AST SERPL-CCNC: 36 U/L (ref 1–32)
BASOPHILS # BLD AUTO: 0.03 10*3/MM3 (ref 0–0.2)
BASOPHILS NFR BLD AUTO: 0.2 % (ref 0–1.5)
BILIRUB SERPL-MCNC: 0.6 MG/DL (ref 0–1.2)
BUN SERPL-MCNC: 8 MG/DL (ref 6–20)
BUN/CREAT SERPL: 11.4 (ref 7–25)
CALCIUM SPEC-SCNC: 8.5 MG/DL (ref 8.6–10.5)
CHLORIDE SERPL-SCNC: 103 MMOL/L (ref 98–107)
CO2 SERPL-SCNC: 20.9 MMOL/L (ref 22–29)
CREAT SERPL-MCNC: 0.7 MG/DL (ref 0.57–1)
DEPRECATED RDW RBC AUTO: 39.7 FL (ref 37–54)
EGFRCR SERPLBLD CKD-EPI 2021: 123.3 ML/MIN/1.73
EOSINOPHIL # BLD AUTO: 0 10*3/MM3 (ref 0–0.4)
EOSINOPHIL NFR BLD AUTO: 0 % (ref 0.3–6.2)
ERYTHROCYTE [DISTWIDTH] IN BLOOD BY AUTOMATED COUNT: 12.4 % (ref 12.3–15.4)
FERRITIN SERPL-MCNC: 68.79 NG/ML (ref 13–150)
GLOBULIN UR ELPH-MCNC: 3.6 GM/DL
GLUCOSE SERPL-MCNC: 152 MG/DL (ref 65–99)
HCT VFR BLD AUTO: 36.6 % (ref 34–46.6)
HGB BLD-MCNC: 12.2 G/DL (ref 12–15.9)
IMM GRANULOCYTES # BLD AUTO: 0.12 10*3/MM3 (ref 0–0.05)
IMM GRANULOCYTES NFR BLD AUTO: 0.6 % (ref 0–0.5)
INR PPP: 1.11 (ref 0.86–1.15)
IRON 24H UR-MRATE: 22 MCG/DL (ref 37–145)
IRON SATN MFR SERPL: 7 % (ref 20–50)
LYMPHOCYTES # BLD AUTO: 0.7 10*3/MM3 (ref 0.7–3.1)
LYMPHOCYTES NFR BLD AUTO: 3.6 % (ref 19.6–45.3)
MAGNESIUM SERPL-MCNC: 1.5 MG/DL (ref 1.6–2.6)
MCH RBC QN AUTO: 29 PG (ref 26.6–33)
MCHC RBC AUTO-ENTMCNC: 33.3 G/DL (ref 31.5–35.7)
MCV RBC AUTO: 87.1 FL (ref 79–97)
MONOCYTES # BLD AUTO: 0.69 10*3/MM3 (ref 0.1–0.9)
MONOCYTES NFR BLD AUTO: 3.5 % (ref 5–12)
NEUTROPHILS NFR BLD AUTO: 18.01 10*3/MM3 (ref 1.7–7)
NEUTROPHILS NFR BLD AUTO: 92.1 % (ref 42.7–76)
NRBC BLD AUTO-RTO: 0 /100 WBC (ref 0–0.2)
PLATELET # BLD AUTO: 228 10*3/MM3 (ref 140–450)
PMV BLD AUTO: 10.5 FL (ref 6–12)
POTASSIUM SERPL-SCNC: 3.6 MMOL/L (ref 3.5–5.2)
PROT SERPL-MCNC: 7.2 G/DL (ref 6–8.5)
PROTHROMBIN TIME: 14.8 SECONDS (ref 11.8–14.9)
RBC # BLD AUTO: 4.2 10*6/MM3 (ref 3.77–5.28)
SODIUM SERPL-SCNC: 134 MMOL/L (ref 136–145)
TIBC SERPL-MCNC: 294 MCG/DL (ref 298–536)
TRANSFERRIN SERPL-MCNC: 197 MG/DL (ref 200–360)
WBC NRBC COR # BLD AUTO: 19.55 10*3/MM3 (ref 3.4–10.8)

## 2025-03-15 PROCEDURE — 25010000002 CEFOXITIN PER 1 G: Performed by: SURGERY

## 2025-03-15 PROCEDURE — 25010000002 ENOXAPARIN PER 10 MG: Performed by: HOSPITALIST

## 2025-03-15 PROCEDURE — 94799 UNLISTED PULMONARY SVC/PX: CPT

## 2025-03-15 PROCEDURE — 82728 ASSAY OF FERRITIN: CPT | Performed by: STUDENT IN AN ORGANIZED HEALTH CARE EDUCATION/TRAINING PROGRAM

## 2025-03-15 PROCEDURE — 99024 POSTOP FOLLOW-UP VISIT: CPT | Performed by: SURGERY

## 2025-03-15 PROCEDURE — 84466 ASSAY OF TRANSFERRIN: CPT | Performed by: HOSPITALIST

## 2025-03-15 PROCEDURE — 85025 COMPLETE CBC W/AUTO DIFF WBC: CPT | Performed by: HOSPITALIST

## 2025-03-15 PROCEDURE — G0378 HOSPITAL OBSERVATION PER HR: HCPCS

## 2025-03-15 PROCEDURE — 83735 ASSAY OF MAGNESIUM: CPT | Performed by: HOSPITALIST

## 2025-03-15 PROCEDURE — 25010000002 MAGNESIUM SULFATE 2 GM/50ML SOLUTION: Performed by: STUDENT IN AN ORGANIZED HEALTH CARE EDUCATION/TRAINING PROGRAM

## 2025-03-15 PROCEDURE — 25010000002 MORPHINE PER 10 MG: Performed by: SURGERY

## 2025-03-15 PROCEDURE — 25810000003 LACTATED RINGERS PER 1000 ML: Performed by: HOSPITALIST

## 2025-03-15 PROCEDURE — 83540 ASSAY OF IRON: CPT | Performed by: HOSPITALIST

## 2025-03-15 PROCEDURE — 80053 COMPREHEN METABOLIC PANEL: CPT | Performed by: HOSPITALIST

## 2025-03-15 PROCEDURE — 85610 PROTHROMBIN TIME: CPT | Performed by: HOSPITALIST

## 2025-03-15 RX ORDER — AMOXICILLIN 250 MG
2 CAPSULE ORAL 2 TIMES DAILY
Status: DISCONTINUED | OUTPATIENT
Start: 2025-03-15 | End: 2025-03-16 | Stop reason: HOSPADM

## 2025-03-15 RX ORDER — BISACODYL 5 MG/1
5 TABLET, DELAYED RELEASE ORAL DAILY PRN
Status: DISCONTINUED | OUTPATIENT
Start: 2025-03-15 | End: 2025-03-16 | Stop reason: HOSPADM

## 2025-03-15 RX ORDER — MAGNESIUM SULFATE HEPTAHYDRATE 40 MG/ML
2 INJECTION, SOLUTION INTRAVENOUS ONCE
Status: COMPLETED | OUTPATIENT
Start: 2025-03-15 | End: 2025-03-15

## 2025-03-15 RX ORDER — BISACODYL 10 MG
10 SUPPOSITORY, RECTAL RECTAL DAILY PRN
Status: DISCONTINUED | OUTPATIENT
Start: 2025-03-15 | End: 2025-03-16 | Stop reason: HOSPADM

## 2025-03-15 RX ORDER — POLYETHYLENE GLYCOL 3350 17 G/17G
17 POWDER, FOR SOLUTION ORAL DAILY
Status: DISCONTINUED | OUTPATIENT
Start: 2025-03-15 | End: 2025-03-16 | Stop reason: HOSPADM

## 2025-03-15 RX ADMIN — SODIUM CHLORIDE, SODIUM LACTATE, POTASSIUM CHLORIDE, CALCIUM CHLORIDE 100 ML/HR: 20; 30; 600; 310 INJECTION, SOLUTION INTRAVENOUS at 03:18

## 2025-03-15 RX ADMIN — SODIUM CHLORIDE 2000 MG: 9 INJECTION, SOLUTION INTRAVENOUS at 17:06

## 2025-03-15 RX ADMIN — SODIUM CHLORIDE 2000 MG: 9 INJECTION, SOLUTION INTRAVENOUS at 09:25

## 2025-03-15 RX ADMIN — SENNOSIDES AND DOCUSATE SODIUM 2 TABLET: 50; 8.6 TABLET ORAL at 21:24

## 2025-03-15 RX ADMIN — SODIUM CHLORIDE 2000 MG: 9 INJECTION, SOLUTION INTRAVENOUS at 21:24

## 2025-03-15 RX ADMIN — ENOXAPARIN SODIUM 40 MG: 100 INJECTION SUBCUTANEOUS at 09:25

## 2025-03-15 RX ADMIN — POLYETHYLENE GLYCOL 3350 17 G: 17 POWDER, FOR SOLUTION ORAL at 10:05

## 2025-03-15 RX ADMIN — MAGNESIUM SULFATE HEPTAHYDRATE 2 G: 40 INJECTION, SOLUTION INTRAVENOUS at 10:10

## 2025-03-15 RX ADMIN — SENNOSIDES AND DOCUSATE SODIUM 2 TABLET: 50; 8.6 TABLET ORAL at 10:05

## 2025-03-15 RX ADMIN — SODIUM CHLORIDE 2000 MG: 9 INJECTION, SOLUTION INTRAVENOUS at 04:10

## 2025-03-15 RX ADMIN — HYDROCODONE BITARTRATE AND ACETAMINOPHEN 1 TABLET: 5; 325 TABLET ORAL at 07:42

## 2025-03-15 RX ADMIN — MORPHINE SULFATE 4 MG: 4 INJECTION, SOLUTION INTRAMUSCULAR; INTRAVENOUS at 03:17

## 2025-03-15 RX ADMIN — SODIUM CHLORIDE, SODIUM LACTATE, POTASSIUM CHLORIDE, CALCIUM CHLORIDE 100 ML/HR: 20; 30; 600; 310 INJECTION, SOLUTION INTRAVENOUS at 21:24

## 2025-03-15 NOTE — PROGRESS NOTES
[facility]   Hospitalist Progress Note  Date: 3/15/2025  Patient Name: Rosa Bhat  : 2000  MRN: [id number]  Date of admission: 3/14/2025  Room/Bed: 360/1      Subjective   Subjective     Chief Complaint: Abdominal pain    Summary:Rosa Bhat is a 24 y.o. female with no known past medical history who presented as a transfer from outside hospital for right upper quadrant abdominal pain.  Found to have acute cholecystitis status post cholecystectomy.     Interval Followup:   Patient still having a little bit of pain.  Discussed that she has a leukocytosis.  Will continue to monitor.  Hopefully she can go home soon    All systems reviewed and negative except for what is outlined above.      Objective   Objective     Vitals:   Temp:  [97.7 °F (36.5 °C)-99.8 °F (37.7 °C)] 98.6 °F (37 °C)  Heart Rate:  [57-96] 96  Resp:  [14-18] 18  BP: ()/(67-89) 110/79  Flow (L/min) (Oxygen Therapy):  [6] 6    Physical Exam   General: NAD  Cardiovascular: RRR, no murmurs   Pulmonary: no conversational dyspnea  GI: Abdomen is soft, hard to appreciate bowel sounds  Psych: Mood and affect appropriate    Result Review    Result Review:  I have personally reviewed these results:  [x]  Laboratory      Lab 03/15/25  0444 25  1542   WBC 19.55* 11.73*   HEMOGLOBIN 12.2 12.2   HEMATOCRIT 36.6 37.0   PLATELETS 228 234   NEUTROS ABS 18.01* 7.93*   IMMATURE GRANS (ABS) 0.12* 0.03   LYMPHS ABS 0.70 2.67   MONOS ABS 0.69 0.97*   EOS ABS 0.00 0.10   MCV 87.1 87.9   LACTATE  --  0.6   PROTIME 14.8 14.2   APTT  --  28.9         Lab 03/15/25  0444 25  1542   SODIUM 134* 138   POTASSIUM 3.6 3.5   CHLORIDE 103 105   CO2 20.9* 23.6   ANION GAP 10.1 9.4   BUN 8 8   CREATININE 0.70 0.62   EGFR 123.3 126.9   GLUCOSE 152* 81   CALCIUM 8.5* 8.9   MAGNESIUM 1.5* 1.7   PHOSPHORUS  --  2.9         Lab 03/15/25  0444 25  1542   TOTAL PROTEIN 7.2 7.3   ALBUMIN 3.6 3.7   GLOBULIN 3.6 3.6   ALT (SGPT) 30 19   AST (SGOT) 36*  19   BILIRUBIN 0.6 0.6   ALK PHOS 67 71   LIPASE  --  18         Lab 03/15/25  0444 03/14/25  1542   PROTIME 14.8 14.2   INR 1.11 1.05             Lab 03/15/25  0444 03/14/25  1700 03/14/25  1542   IRON 22*  --   --    IRON SATURATION (TSAT) 7*  --   --    TIBC 294*  --   --    TRANSFERRIN 197*  --   --    FERRITIN 68.79  --   --    ABO TYPING  --  O O   RH TYPING  --  Negative Negative   ANTIBODY SCREEN  --   --  Negative         Brief Urine Lab Results  (Last result in the past 365 days)        Color   Clarity   Blood   Leuk Est   Nitrite   Protein   CREAT   Urine HCG        03/14/25 1543 Yellow   Clear   Negative   Moderate (2+)   Negative   Negative           03/14/25 1543               Negative             [x]  Microbiology   Microbiology Results (last 10 days)       ** No results found for the last 240 hours. **          [x]  Radiology  No radiology results for the last 7 days  []  EKG/Telemetry   []  Cardiology/Vascular   []  Pathology  []  Old records  []  Other:    Assessment & Plan        Assessment and Plan:    #Cholecystitis s/p cholecystectomy  Antibiotics per general surgery  General Surgery following, appreciate recommendations  Fluids  MiraLAX and senna  Pain control per general surgery     Discussed with RN.    VTE Prophylaxis:  Pharmacologic & mechanical VTE prophylaxis orders are present.        CODE STATUS:   Code Status (Patient has no pulse and is not breathing): CPR (Attempt to Resuscitate)  Medical Interventions (Patient has pulse or is breathing): Full      Electronically signed by Brett Zimmerman MD, 3/15/2025, 15:04 EDT.

## 2025-03-15 NOTE — PROGRESS NOTES
[facility]     Progress Note    Patient Name: Rosa Bhat  : 2000  MRN: [id number]  Primary Care Physician:  Provider, No Known  Date of admission: 3/14/2025    Subjective   Subjective     HPI:  Patient Reports feeling a little bit better.  She still having a little bit of right-sided abdominal pain.    Review of Systems    Objective   Objective     Vitals:   Temp:  [97.7 °F (36.5 °C)-99.8 °F (37.7 °C)] 98.4 °F (36.9 °C)  Heart Rate:  [57-96] 96  Resp:  [14-18] 16  BP: ()/(65-89) 125/78  Flow (L/min) (Oxygen Therapy):  [6] 6    Physical Exam   She appears well today.  Her abdomen is appropriately tender.  Meds:  Scheduled Meds:ceFOXitin, 2,000 mg, Intravenous, Q6H  enoxaparin sodium, 40 mg, Subcutaneous, Daily  Scopolamine, 1 patch, Transdermal, Once  sodium chloride, 10 mL, Intravenous, Q12H  sodium chloride, 10 mL, Intravenous, Q12H      Continuous Infusions:lactated ringers, 100 mL/hr, Last Rate: 100 mL/hr (03/15/25 0318)  lactated ringers, 9 mL/hr, Last Rate: 9 mL/hr (25 1753)      PRN Meds:.  [DISCONTINUED] acetaminophen **OR** acetaminophen **OR** [DISCONTINUED] acetaminophen    acetaminophen **OR** acetaminophen    senna-docusate sodium **AND** polyethylene glycol **AND** bisacodyl **AND** bisacodyl    docusate sodium    HYDROcodone-acetaminophen    ketorolac    lactated ringers    Morphine **AND** naloxone    ondansetron    ondansetron ODT **OR** ondansetron    oxyCODONE    sodium chloride    sodium chloride    sodium chloride    sodium chloride    traMADol     Result Review    Result Review:  I have personally reviewed the results from the time of this admission to 3/15/2025 08:49 EDT and agree with these findings:  []  Laboratory  []  Microbiology  []  Radiology  []  EKG/Telemetry   []  Cardiology/Vascular   []  Pathology  []  Old records  []  Other:  Most notable findings include: White blood cell count of 19,000    Assessment & Plan   Assessment / Plan     Brief Patient  Summary:  Rosa Bhat is a 24 y.o. female who is status post robotic cholecystectomy for gallstones and cholecystitis.  She still has an elevated white count.    Active Hospital Problems:  Active Hospital Problems    Diagnosis     **Cholecystitis        Plan:   I would continue her current antibiotics today.  We will repeat a CBC tomorrow and if her white count is better she can probably go home tomorrow.    VTE Prophylaxis:  Pharmacologic & mechanical VTE prophylaxis orders are present.        CODE STATUS:    Code Status (Patient has no pulse and is not breathing): CPR (Attempt to Resuscitate)  Medical Interventions (Patient has pulse or is breathing): Full      Electronically signed by Ryne Muhammad MD, 03/15/25, 8:49 AM EDT.

## 2025-03-15 NOTE — PAYOR COMM NOTE
"Rosa Bhat (24 y.o. Female)     PATIENT INFORMATION  Name:  Rosa Bhat  MRN#:     [id number]  :  2000         ADMISSION INFORMATION  CLASS: Observation   DOS:  3/15/25      CURRENT ATTENDING PROVIDER INFORMATION  Name/NPI: Brett Zimmerman MD [[id number]]  Phone:  Phone: [phone]  Fax:  [phone]      REQUESTING PROVIDER and RENDERING FACILITY  Name:  [facility]   NPI:  [id number]  TID:  [id number]  Address:      [facility] [address] [place] [address]  Phone:               [phone]  Fax:  [phone]      UTILIZATION REVIEW CONTACT INFORMATION  Phone:      [phone]  Fax:           [phone]    ADMISSION DIAGNOSIS  Cholecystitis [K81.9]    ++++++++++++++++++++++++++++++++++++++++++++++++++++++++++++++++++++++++++++++++        Date of Birth   2000    Social Security Number       Address   [address] [address]    Home Phone   [phone]    MRN   [id number]       [employer]   None    Marital Status   Single                            Admission Date   3/14/2025    Admission Type   Urgent    Admitting Provider   Anyi Hawk MD    Attending Provider   Brett Zimmerman MD    Department, Room/Bed   [address] AMBULATORY SERVICES, 360/1       Discharge Date       Discharge Disposition       Discharge Destination                                 Attending Provider: Brett Zimmerman MD    Allergies: Tuberculin Tests    Isolation: None   Infection: None   Code Status: CPR    Ht: 157.5 cm (62\")   Wt: 88 kg (194 lb 0.1 oz)    Admission Cmt: None   Principal Problem: Cholecystitis [K81.9]                   Active Insurance as of 3/14/2025       Primary Coverage       Payor Plan Insurance Group Employer/Plan Group    [employer] BLUE CROSS AN[employer] BLUE CROSS BLUE SHIELD PPO [id number]       Payor Plan Address Payor Plan Phone Number Payor Plan Fax Number Effective Dates    [address] [phone]  2024 - None Entered    [facility] " [id number]         Subscriber Name Subscriber Birth Date Member ID       Anmol Bhat 1974 [id number]                                History & Physical        Anyi Hawk MD at 25 1539           [facility] HISTORY AND PHYSICAL  Date: 3/14/2025   Patient Name: Rosa Bhat  : 2000  MRN: [id number]  Primary Care Physician:  Provider, No Known  Date of admission: 3/14/2025    Subjective  Subjective     Chief Complaint: abdominal pain     HPI:    Rosa Bhat is a 24 y.o. female with no PMH presented as a transfer from OSH for right upper quadrant abdominal pain. Patient is here with her family at bedside.  Patient states that 4 days back is when the pain started after dinner. She had sudden, pressure like right upper quadrant pain radiating to her back, associated with nausea. The pain subsided without any intervention that day. Over the course of entire week, abdominal pain recurred with increasing intensity with worst being yesterday night after dinner. It was associated with NBNB vomiting. She did try taking NSAIDs without much relief. Denies any fever/chills, no diarrhea, hematochezia, hematemesis.     She presented to the OSH where CT abdomen showed cholecystitis with gallstones in the GB neck. No evidence of choledocholithiasis or biliary ductal dilatation. Initial AST, ALT, alk phos, bili were within normal limits. WBC 13,000. Vital signs normal, no evidence of sepsis. On call Surgery was consulted and patient transferred here for further management.   OSH Records are not accessible via Epic.     Personal History     Past Medical History:  History reviewed. No pertinent past medical history.    Past Surgical History:  History reviewed. No pertinent surgical history.    Family History:   Family History   Problem Relation Age of Onset    Hypertension Mother     Kidney disease Mother     Hypertension Father     Diabetes Maternal Grandfather     Alcohol  abuse Paternal Grandfather     Kidney disease Sister        Social History:   Social History     Socioeconomic History    Marital status: Single   Tobacco Use    Smoking status: Never    Smokeless tobacco: Never   Vaping Use    Vaping status: Never Used   Substance and Sexual Activity    Alcohol use: Never    Drug use: Never    Sexual activity: Never       Home Medications:   none    Allergies:  Allergies   Allergen Reactions    Tuberculin Tests Other (See Comments)     Reacts as if she has TB when she does not and usually has to do the blood test as well        Review of Systems   All systems were reviewed and negative except for as mentioned in HPI    Objective  Objective     Vitals:   Temp:  [97.8 °F (36.6 °C)] 97.8 °F (36.6 °C)  Heart Rate:  [69] 69  Resp:  [16] 16  BP: (109)/(65) 109/65    Physical Exam    Constitutional: Awake, alert, no acute distress   Eyes: Pupils equal, sclerae anicteric, no conjunctival injection   HENT: NCAT, mucous membranes moist   Neck: Supple, no thyromegaly, no lymphadenopathy, trachea midline   Respiratory: Clear to auscultation bilaterally, nonlabored respirations    Cardiovascular: RRR, no murmurs, rubs, or gallops, palpable pedal pulses bilaterally   Gastrointestinal: Positive bowel sounds, soft, nontender, nondistended   Musculoskeletal: No bilateral ankle edema, no clubbing or cyanosis to extremities   Psychiatric: Appropriate affect, cooperative   Neurologic: Oriented x 3, strength symmetric in all extremities, Cranial Nerves grossly intact to confrontation, speech clear   Skin: No rashes     Result Review   Result Review:  reviewed the verbal results from the time of this admission   [x]  Laboratory  []  Microbiology  [x]  Radiology  []  EKG/Telemetry   []  Cardiology/Vascular   []  Pathology  []  Old records  []  Other:      Assessment & Plan  Assessment / Plan     Assessment/Plan:   #Gallstone cholecystitis  - afebrile and hemodynamically stable  - not septic  Plan:  -  Repeat labs and Bcx  - Iv zosyn en-route to procedure  - mIVF ordered, prn Zofran and pain meds  - Surgery input noted, Will keep NPO for lap zohaib -patient agreeable.      VTE Prophylaxis:  Mechanical VTE prophylaxis orders are signed & held. Pharmacologic VTE prophylaxis orders are present.        CODE STATUS:    Code Status (Patient has no pulse and is not breathing): CPR (Attempt to Resuscitate)  Medical Interventions (Patient has pulse or is breathing): Full Support  Level Of Support Discussed With: Patient      Admission Status:  I believe this patient meets observation status.    Electronically signed by Anyi Hawk MD, 03/14/25, 3:40 PM EDT.              Electronically signed by Anyi Hawk MD at 03/14/25 2917       Shawn Peralta MD at 03/14/25 1224          24-year-old female with no past medical history who presented to [facility] ER today with right upper quadrant abdominal pain and nausea.  No diarrhea, hematochezia, vomiting.  Initially, AST, ALT, alk phos, bili were within normal limits.  WBC 13,000.  Vital signs normal, no evidence of sepsis.  Imaging concerning for cholecystitis with gallstones in the gallbladder neck.  No evidence of choledocholithiasis or biliary ductal dilatation.  The case was discussed with Dr. Muhammad who recommends transfer for cholecystectomy.  Recommend reach out to Dr. Muhammad upon arrival.    Electronically signed by Shawn Peralta MD at 03/14/25 1226       Current Facility-Administered Medications   Medication Dose Route Frequency Provider Last Rate Last Admin    acetaminophen (TYLENOL) 160 MG/5ML oral solution 650 mg  650 mg Oral Q4H PRN Anyi Hawk MD        acetaminophen (TYLENOL) tablet 650 mg  650 mg Oral Q4H PRN Ryne Muhammad MD        Or    acetaminophen (TYLENOL) suppository 650 mg  650 mg Rectal Q4H PRN Ryne Muhammad MD        sennosides-docusate (PERICOLACE) 8.6-50 MG per tablet 2 tablet  2  tablet Oral BID PRN Anyi Hawk MD        And    polyethylene glycol (MIRALAX) packet 17 g  17 g Oral Daily PRN Anyi Hawk MD        And    bisacodyl (DULCOLAX) EC tablet 5 mg  5 mg Oral Daily PRN Anyi Hawk MD        And    bisacodyl (DULCOLAX) suppository 10 mg  10 mg Rectal Daily PRN Anyi Hawk MD        cefOXItin (MEFOXIN) 2,000 mg in sodium chloride 0.9 % 100 mL IVPB-VTB  2,000 mg Intravenous Q6H Ryne Muhammad MD   Stopped at 03/15/25 0440    docusate sodium (COLACE) capsule 100 mg  100 mg Oral BID PRN Ryne Muhammad MD        enoxaparin sodium (LOVENOX) syringe 40 mg  40 mg Subcutaneous Daily Anyi Hawk MD        HYDROcodone-acetaminophen (NORCO) 5-325 MG per tablet 1 tablet  1 tablet Oral Q4H PRN Ryne Muhammad MD   1 tablet at 03/15/25 0742    ketorolac (TORADOL) injection 15 mg  15 mg Intravenous Q6H PRN Ryne Muhammad MD        lactated ringers infusion  100 mL/hr Intravenous Continuous Anyi Hawk  mL/hr at 03/15/25 0318 100 mL/hr at 03/15/25 0318    lactated ringers infusion  9 mL/hr Intravenous Continuous PRN Maxi Valdovinos MD 9 mL/hr at 03/14/25 1753 New Bag at 03/14/25 1919    magnesium sulfate 2g/50 mL (PREMIX) infusion  2 g Intravenous Once Brett Zimmerman MD        morphine injection 4 mg  4 mg Intravenous Q2H PRN Ryne Muhammad MD   4 mg at 03/15/25 0317    And    naloxone (NARCAN) injection 0.4 mg  0.4 mg Intravenous Q5 Min PRN Ryne Muhammad MD        ondansetron (ZOFRAN) injection 4 mg  4 mg Intravenous Q6H PRN Ryne Muhammad MD        ondansetron ODT (ZOFRAN-ODT) disintegrating tablet 4 mg  4 mg Oral Q6H PRN Ryne Muhammad MD        Or    ondansetron (ZOFRAN) injection 4 mg  4 mg Intravenous Q6H PRN Ryne Muhammad MD        oxyCODONE (ROXICODONE) immediate release tablet 5 mg  5 mg Oral Q6H PRN Anyi Hawk MD        scopolamine patch 1 mg/72 hr  1 patch  Transdermal Once Maxi Valdovinos MD   1 patch at 03/14/25 1648    sodium chloride 0.9 % flush 10 mL  10 mL Intravenous Q12H Anyi Hawk MD        sodium chloride 0.9 % flush 10 mL  10 mL Intravenous PRN Anyi Hawk MD        sodium chloride 0.9 % flush 10 mL  10 mL Intravenous Q12H Ryne Muhammad MD        sodium chloride 0.9 % flush 10 mL  10 mL Intravenous PRN Ryne Muhammad MD        sodium chloride 0.9 % infusion 40 mL  40 mL Intravenous PRN Anyi Hawk MD        sodium chloride 0.9 % infusion 40 mL  40 mL Intravenous PRN Ryne Muhammad MD        traMADol (ULTRAM) tablet 50 mg  50 mg Oral Q6H PRN Anyi Hawk MD         Lab Results (last 24 hours)       Procedure Component Value Units Date/Time    Ferritin [[id number]]  (Normal) Collected: 03/15/25 0444    Specimen: Blood from Arm, Left Updated: 03/15/25 0851     Ferritin 68.79 ng/mL     Narrative:      <12 ng/mL usually associated with Iron Deficiency Anemia. Above normal range levels may be due to Hepatic and/or Chronic Inflammatory Disease.  Results may be falsely decreased if patient taking Biotin.      Comprehensive Metabolic Panel [[id number]]  (Abnormal) Collected: 03/15/25 0444    Specimen: Blood from Arm, Left Updated: 03/15/25 0536     Glucose 152 mg/dL      BUN 8 mg/dL      Creatinine 0.70 mg/dL      Sodium 134 mmol/L      Potassium 3.6 mmol/L      Chloride 103 mmol/L      CO2 20.9 mmol/L      Calcium 8.5 mg/dL      Total Protein 7.2 g/dL      Albumin 3.6 g/dL      ALT (SGPT) 30 U/L      AST (SGOT) 36 U/L      Alkaline Phosphatase 67 U/L      Total Bilirubin 0.6 mg/dL      Globulin 3.6 gm/dL      A/G Ratio 1.0 g/dL      BUN/Creatinine Ratio 11.4     Anion Gap 10.1 mmol/L      eGFR 123.3 mL/min/1.73     Narrative:      GFR Categories in Chronic Kidney Disease (CKD)      GFR Category          GFR (mL/min/1.73)    Interpretation  G1                     90 or greater         Normal or high  (1)  G2                      60-89                Mild decrease (1)  G3a                   45-59                Mild to moderate decrease  G3b                   30-44                Moderate to severe decrease  G4                    15-29                Severe decrease  G5                    14 or less           Kidney failure          (1)In the absence of evidence of kidney disease, neither GFR category G1 or G2 fulfill the criteria for CKD.    eGFR calculation 2021 CKD-EPI creatinine equation, which does not include race as a factor    Iron Profile [[id number]]  (Abnormal) Collected: 03/15/25 0444    Specimen: Blood from Arm, Left Updated: 03/15/25 0536     Iron 22 mcg/dL      Iron Saturation (TSAT) 7 %      Transferrin 197 mg/dL      TIBC 294 mcg/dL     Magnesium [[id number]]  (Abnormal) Collected: 03/15/25 0444    Specimen: Blood from Arm, Left Updated: 03/15/25 0536     Magnesium 1.5 mg/dL     CBC Auto Differential [[id number]]  (Abnormal) Collected: 03/15/25 0444    Specimen: Blood from Arm, Left Updated: 03/15/25 0534     WBC 19.55 10*3/mm3      RBC 4.20 10*6/mm3      Hemoglobin 12.2 g/dL      Hematocrit 36.6 %      MCV 87.1 fL      MCH 29.0 pg      MCHC 33.3 g/dL      RDW 12.4 %      RDW-SD 39.7 fl      MPV 10.5 fL      Platelets 228 10*3/mm3      Neutrophil % 92.1 %      Lymphocyte % 3.6 %      Monocyte % 3.5 %      Eosinophil % 0.0 %      Basophil % 0.2 %      Immature Grans % 0.6 %      Neutrophils, Absolute 18.01 10*3/mm3      Lymphocytes, Absolute 0.70 10*3/mm3      Monocytes, Absolute 0.69 10*3/mm3      Eosinophils, Absolute 0.00 10*3/mm3      Basophils, Absolute 0.03 10*3/mm3      Immature Grans, Absolute 0.12 10*3/mm3      nRBC 0.0 /100 WBC     Protime-INR [[id number]]  (Normal) Collected: 03/15/25 0444    Specimen: Blood from Arm, Left Updated: 03/15/25 0533     Protime 14.8 Seconds      INR 1.11    Narrative:      Suggested Therapeutic Ranges For Oral Anticoagulant Therapy:  Level of Therapy                       INR Target Range  Standard Dose                            2.0-3.0  High Dose                                2.5-3.5  Patients not receiving anticoagulant  Therapy Normal Range                     0.86-1.15    Comprehensive Metabolic Panel [[id number]] Collected: 03/14/25 1542    Specimen: Blood from Arm, Right Updated: 03/14/25 1612     Glucose 81 mg/dL      BUN 8 mg/dL      Creatinine 0.62 mg/dL      Sodium 138 mmol/L      Potassium 3.5 mmol/L      Chloride 105 mmol/L      CO2 23.6 mmol/L      Calcium 8.9 mg/dL      Total Protein 7.3 g/dL      Albumin 3.7 g/dL      ALT (SGPT) 19 U/L      AST (SGOT) 19 U/L      Alkaline Phosphatase 71 U/L      Total Bilirubin 0.6 mg/dL      Globulin 3.6 gm/dL      A/G Ratio 1.0 g/dL      BUN/Creatinine Ratio 12.9     Anion Gap 9.4 mmol/L      eGFR 126.9 mL/min/1.73     Narrative:      GFR Categories in Chronic Kidney Disease (CKD)      GFR Category          GFR (mL/min/1.73)    Interpretation  G1                     90 or greater         Normal or high (1)  G2                      60-89                Mild decrease (1)  G3a                   45-59                Mild to moderate decrease  G3b                   30-44                Moderate to severe decrease  G4                    15-29                Severe decrease  G5                    14 or less           Kidney failure          (1)In the absence of evidence of kidney disease, neither GFR category G1 or G2 fulfill the criteria for CKD.    eGFR calculation 2021 CKD-EPI creatinine equation, which does not include race as a factor    Lipase [[id number]]  (Normal) Collected: 03/14/25 1542    Specimen: Blood from Arm, Right Updated: 03/14/25 1612     Lipase 18 U/L     Magnesium [[id number]]  (Normal) Collected: 03/14/25 1542    Specimen: Blood from Arm, Right Updated: 03/14/25 1612     Magnesium 1.7 mg/dL     Phosphorus [[id number]]  (Normal) Collected: 03/14/25 1542    Specimen: Blood from Arm, Right Updated: 03/14/25 1612      Phosphorus 2.9 mg/dL     Protime-INR [[id number]]  (Normal) Collected: 03/14/25 1542    Specimen: Blood from Arm, Right Updated: 03/14/25 1612     Protime 14.2 Seconds      INR 1.05    Narrative:      Suggested Therapeutic Ranges For Oral Anticoagulant Therapy:  Level of Therapy                      INR Target Range  Standard Dose                            2.0-3.0  High Dose                                2.5-3.5  Patients not receiving anticoagulant  Therapy Normal Range                     0.86-1.15    aPTT [[id number]]  (Normal) Collected: 03/14/25 1542    Specimen: Blood from Arm, Right Updated: 03/14/25 1612     PTT 28.9 seconds     Lactic Acid, Plasma [[id number]]  (Normal) Collected: 03/14/25 1542    Specimen: Blood from Arm, Right Updated: 03/14/25 1609     Lactate 0.6 mmol/L     Urinalysis, Microscopic Only - Urine, Clean Catch [[id number]]  (Abnormal) Collected: 03/14/25 1543    Specimen: Urine, Clean Catch Updated: 03/14/25 1604     RBC, UA 0-2 /HPF      WBC, UA 11-20 /HPF      Bacteria, UA Trace /HPF      Squamous Epithelial Cells, UA 3-6 /HPF      Hyaline Casts, UA None Seen /LPF      Methodology Automated Microscopy    Urinalysis With Microscopic If Indicated (No Culture) - Urine, Clean Catch [[id number]]  (Abnormal) Collected: 03/14/25 1543    Specimen: Urine, Clean Catch Updated: 03/14/25 1604     Color, UA Yellow     Appearance, UA Clear     pH, UA 7.0     Specific Gravity, UA 1.018     Glucose, UA Negative     Ketones, UA 15 mg/dL (1+)     Bilirubin, UA Negative     Blood, UA Negative     Protein, UA Negative     Leuk Esterase, UA Moderate (2+)     Nitrite, UA Negative     Urobilinogen, UA 0.2 E.U./dL    Extra Tubes [[id number]] Collected: 03/14/25 1542    Specimen: Blood from Arm, Right Updated: 03/14/25 1600    Narrative:      The following orders were created for panel order Extra Tubes.  Procedure                               Abnormality         Status                     ---------                                -----------         ------                     Gold Top - SST[[id number]]                                   Final result                 Please view results for these tests on the individual orders.    Gold Top - SST [[id number]] Collected: 03/14/25 1542    Specimen: Blood from Arm, Right Updated: 03/14/25 1600     Extra Tube Hold for add-ons.     Comment: Auto resulted.       CBC Auto Differential [[id number]]  (Abnormal) Collected: 03/14/25 1542    Specimen: Blood from Arm, Right Updated: 03/14/25 1555     WBC 11.73 10*3/mm3      RBC 4.21 10*6/mm3      Hemoglobin 12.2 g/dL      Hematocrit 37.0 %      MCV 87.9 fL      MCH 29.0 pg      MCHC 33.0 g/dL      RDW 12.7 %      RDW-SD 40.9 fl      MPV 9.9 fL      Platelets 234 10*3/mm3      Neutrophil % 67.4 %      Lymphocyte % 22.8 %      Monocyte % 8.3 %      Eosinophil % 0.9 %      Basophil % 0.3 %      Immature Grans % 0.3 %      Neutrophils, Absolute 7.93 10*3/mm3      Lymphocytes, Absolute 2.67 10*3/mm3      Monocytes, Absolute 0.97 10*3/mm3      Eosinophils, Absolute 0.10 10*3/mm3      Basophils, Absolute 0.03 10*3/mm3      Immature Grans, Absolute 0.03 10*3/mm3      nRBC 0.0 /100 WBC     Pregnancy, Urine - Urine, Clean Catch [[id number]]  (Normal) Collected: 03/14/25 1543    Specimen: Urine, Clean Catch Updated: 03/14/25 1553     HCG, Urine QL Negative    Narrative:      Sensitive immunoassays may demonstrate false positive results  with specimens containing heterophilic antibodies. Assays may  also exhibit false-positive or false-negative results with  specimens containing human anti-mouse antibodies. These   specimens may come from patients receving preparations of  mouse monoclonal antibodies for diagnosis or therapy or having  been exposed to mice. If the qualitative interpretation is  inconsistent with the clinical evaluation, results should be   confirmed by an alternate hCG method, ie. quantitative hCG.  As with all urine pregnancy test, this  test does not reliably  detect hCG degradation products, including free-beta hCG and  beta core fragments.          Imaging Results (Last 24 Hours)       ** No results found for the last 24 hours. **          Orders (last 24 hrs)        Start     Ordered    03/16/25 0600  CBC & Differential  Morning Draw         03/15/25 0851    03/16/25 0600  Basic Metabolic Panel  Morning Draw         03/15/25 0901    03/16/25 0600  Magnesium  Morning Draw         03/15/25 0901    03/15/25 1000  magnesium sulfate 2g/50 mL (PREMIX) infusion  Once         03/15/25 0901    03/15/25 0828  Ferritin  STAT         03/15/25 0828    03/15/25 0600  CBC Auto Differential  Morning Draw         03/14/25 1519    03/15/25 0600  Comprehensive Metabolic Panel  Morning Draw         03/14/25 1519    03/15/25 0600  Protime-INR  Morning Draw         03/14/25 1519    03/15/25 0600  Iron Profile  Morning Draw         03/14/25 1519    03/15/25 0600  Magnesium  Morning Draw         03/14/25 1519    03/14/25 2215  sodium chloride 0.9 % flush 10 mL  Every 12 Hours Scheduled         03/14/25 2117 03/14/25 2215  cefOXItin (MEFOXIN) 2,000 mg in sodium chloride 0.9 % 100 mL IVPB-VTB  Every 6 Hours         03/14/25 2117 03/14/25 2200  Incentive Spirometry  Every 2 Hours While Awake       03/14/25 2117 03/14/25 2118  Code Status and Medical Interventions: CPR (Attempt to Resuscitate); Full  Continuous         03/14/25 2117 03/14/25 2118  Strict Intake and Output  Every Shift       03/14/25 2117 03/14/25 2118  Snack: Chewing Gum to Increase Saliva Flow & Provide Gas Pain Relief  Once        Comments: Chewing Gum to Increase Saliva Flow & Provide Gas Pain Relief    03/14/25 2117 03/14/25 2118  Turn, Cough & Deep Breathe  Once         03/14/25 2117 03/14/25 2118  Ambulate Patient  Every Shift       03/14/25 2117 03/14/25 2118  Appropriate VTE Prophylaxis Orders in Place  Once         03/14/25 2117 03/14/25 2118  Insert Peripheral IV  Once   "       03/14/25 2117 03/14/25 2118  Saline Lock & Maintain IV Access  Continuous         03/14/25 2117 03/14/25 2118  Diet: Regular/House; Fluid Consistency: Thin (IDDSI 0)  Diet Effective Now         03/14/25 2117 03/14/25 2117  sodium chloride 0.9 % flush 10 mL  As Needed         03/14/25 2117 03/14/25 2117  acetaminophen (TYLENOL) tablet 650 mg  Every 4 Hours PRN        Placed in \"Or\" Linked Group    03/14/25 2117 03/14/25 2117  acetaminophen (TYLENOL) suppository 650 mg  Every 4 Hours PRN        Placed in \"Or\" Linked Group    03/14/25 2117 03/14/25 2117  ketorolac (TORADOL) injection 15 mg  Every 6 Hours PRN         03/14/25 2117 03/14/25 2117  morphine injection 4 mg  Every 2 Hours PRN        Placed in \"And\" Linked Group    03/14/25 2117 03/14/25 2117  naloxone (NARCAN) injection 0.4 mg  Every 5 Minutes PRN        Placed in \"And\" Linked Group    03/14/25 2117 03/14/25 2117  ondansetron ODT (ZOFRAN-ODT) disintegrating tablet 4 mg  Every 6 Hours PRN        Placed in \"Or\" Linked Group    03/14/25 2117 03/14/25 2117  ondansetron (ZOFRAN) injection 4 mg  Every 6 Hours PRN        Placed in \"Or\" Linked Group    03/14/25 2117 03/14/25 2117  docusate sodium (COLACE) capsule 100 mg  2 Times Daily PRN         03/14/25 2117 03/14/25 2117  HYDROcodone-acetaminophen (NORCO) 5-325 MG per tablet 1 tablet  Every 4 Hours PRN         03/14/25 2117 03/14/25 2100  sodium chloride 0.9 % flush 10 mL  Every 12 Hours Scheduled         03/14/25 1519 03/14/25 2100  sodium chloride 0.9 % flush 10 mL  Every 12 Hours Scheduled,   Status:  Discontinued         03/14/25 1704    03/14/25 1929  Oxygen Therapy- Nasal Cannula; Titrate 1-6 LPM Per SpO2; 90 - 95%  Continuous         03/14/25 1928 03/14/25 1929  Continuous Pulse Oximetry  Continuous         03/14/25 1928 03/14/25 1929  POC Glucose STAT  STAT        Comments: Post op Glucose Check on All Diabetic Patients, Notify Anesthesia if Blood " "Sugar is Less Than 80 mg/dL or Greater Than 180mg/dL      03/14/25 1928 03/14/25 1929  Vital Signs Every 5 Minutes for 15 Minutes, Every 15 Minutes Thereafter.  Continuous         03/14/25 1928 03/14/25 1929  Apply Warming Lambert  Once         03/14/25 1928 03/14/25 1929  Notify Anesthesia of Any Acute Changes in Patient Condition  Until Discontinued         03/14/25 1928 03/14/25 1929  Notify Anesthesia for Unrelieved Pain  Until Discontinued         03/14/25 1928 03/14/25 1929  Once Discharge Criteria to Floor Met, Follow Surgeon Orders  Continuous         03/14/25 1928 03/14/25 1929  Discharge Patient From PACU When Discharge Criteria Met  Continuous         03/14/25 1928 03/14/25 1929  Vital Signs  Per Hospital Policy/Protocol         03/14/25 1929 03/14/25 1928  Suction  As Needed,   Status:  Canceled       03/14/25 1928 03/14/25 1928  oxyCODONE (ROXICODONE) immediate release tablet 5 mg  Every 15 Minutes PRN,   Status:  Discontinued         03/14/25 1928 03/14/25 1928  HYDROmorphone (DILAUDID) injection 0.25 mg  Every 5 Minutes PRN,   Status:  Discontinued         03/14/25 1928 03/14/25 1928  HYDROmorphone (DILAUDID) injection 0.5 mg  Every 5 Minutes PRN,   Status:  Discontinued         03/14/25 1928 03/14/25 1928  ondansetron (ZOFRAN) injection 4 mg  Once As Needed,   Status:  Discontinued         03/14/25 1928 03/14/25 1928  promethazine (PHENERGAN) suppository 25 mg  Once As Needed,   Status:  Discontinued        Placed in \"Or\" Linked Group    03/14/25 1928 03/14/25 1928  promethazine (PHENERGAN) tablet 25 mg  Once As Needed,   Status:  Discontinued        Placed in \"Or\" Linked Group    03/14/25 1928 03/14/25 1928  sodium chloride 0.9 % infusion 40 mL  As Needed         03/14/25 1929 03/14/25 1927  ondansetron (ZOFRAN) injection 4 mg  Every 6 Hours PRN         03/14/25 1927 03/14/25 1909  bupivacaine (PF) (MARCAINE) 0.25 % injection  As Needed,   Status:  " Discontinued         03/14/25 1909    03/14/25 1859  Tissue Pathology Exam  RELEASE UPON ORDERING         03/14/25 1859    03/14/25 1845  sodium chloride (NS) irrigation solution  As Needed,   Status:  Discontinued         03/14/25 1845    03/14/25 1800  Oral Care  2 Times Daily       03/14/25 1519    03/14/25 1800  indocyanine green (IC-GREEN) injection 2.5 mg  Once         03/14/25 1704    03/14/25 1730  acetaminophen (TYLENOL) tablet 1,000 mg  Once         03/14/25 1636    03/14/25 1730  Midazolam HCl (PF) (VERSED) injection 2 mg  Once         03/14/25 1636    03/14/25 1730  scopolamine patch 1 mg/72 hr  Once         03/14/25 1636    03/14/25 1705  Follow Anesthesia Guidelines / Protocol  Once         03/14/25 1704    03/14/25 1705  Verify / Perform Chlorhexidine Skin Prep  Once        Comments: Chlorhexidine Skin Wipes and Instructions For All Patients Having A Procedure Requiring an Outward Incision if Not Allergic.  If Allergic, Give Antibacterial Skin Wipes and Instructions.  Do Not Use For Facial Cases or on Any Mucus Membranes.    03/14/25 1704    03/14/25 1705  Insert Peripheral IV  Once         03/14/25 1704    03/14/25 1705  Saline Lock & Maintain IV Access  Continuous,   Status:  Canceled         03/14/25 1704    03/14/25 1705  Place Sequential Compression Device  Once         03/14/25 1704    03/14/25 1705  Maintain Sequential Compression Device  Continuous         03/14/25 1704    03/14/25 1705  POC Urine Pregnancy  Once         03/14/25 1704    03/14/25 1704  sodium chloride 0.9 % flush 10 mL  As Needed,   Status:  Discontinued         03/14/25 1704    03/14/25 1704  sodium chloride 0.9 % infusion 40 mL  As Needed,   Status:  Discontinued         03/14/25 1704    03/14/25 1635  Remove Scopolamine Patch 24 Hours After Application  Continuous        Comments: ONLY PATIENTS WITH SCOPOLAMINE PATCH ORDER: INSTRUCT PATIENT TO REMOVE 24 HRS AFTER APPLICATION    03/14/25 1636    03/14/25 1635  Continuous  "Pulse Oximetry  Continuous,   Status:  Canceled         03/14/25 1636    03/14/25 1635  Pregnancy, Urine - Urine, Clean Catch  STAT         03/14/25 1636    03/14/25 1635  Insert Peripheral IV  Once         03/14/25 1636    03/14/25 1634  Vital Signs - Per Anesthesia Protocol  As Needed,   Status:  Canceled       03/14/25 1636    03/14/25 1634  lactated ringers infusion  Continuous PRN         03/14/25 1636    03/14/25 1620  ABO RH Specimen Verification  STAT         03/14/25 1620    03/14/25 1615  enoxaparin sodium (LOVENOX) syringe 40 mg  Daily         03/14/25 1519    03/14/25 1615  lactated ringers infusion  Continuous         03/14/25 1519    03/14/25 1600  Vital Signs  Every 4 Hours       03/14/25 1519    03/14/25 1553  Urinalysis, Microscopic Only - Urine, Clean Catch  Once         03/14/25 1552    03/14/25 1551  Extra Tubes  Once         03/14/25 1550    03/14/25 1551  Gold Top - SST  PROCEDURE ONCE         03/14/25 1550    03/14/25 1523  Pregnancy, Urine - Urine, Clean Catch  STAT         03/14/25 1522    03/14/25 1522  POC Urine Pregnancy  Once         03/14/25 1522    03/14/25 1521  Case Request  Once         03/14/25 1521    03/14/25 1521  Follow Anesthesia Guidelines / Protocol  Continuous         03/14/25 1521    03/14/25 1521  Perform Chlorhexidine Skin Prep Night Before and Morning of Procedure  Until Discontinued        Comments: Chlorhexidine Skin Prep and Instructions For All Patients Having A Procedure Requiring an Outward Incision if Not Allergic. If Allergic, Give Antibacterial Skin Wipes and Instructions. Do Not Use For Facial Cases or on Any Mucus Membranes.    03/14/25 1521    03/14/25 1519  ondansetron (ZOFRAN) injection 4 mg  Every 6 Hours PRN,   Status:  Discontinued         03/14/25 1519    03/14/25 1519  sennosides-docusate (PERICOLACE) 8.6-50 MG per tablet 2 tablet  2 Times Daily PRN        Placed in \"And\" Linked Group    03/14/25 1519    03/14/25 1519  polyethylene glycol (MIRALAX) " "packet 17 g  Daily PRN        Placed in \"And\" Linked Group    03/14/25 1519    03/14/25 1519  bisacodyl (DULCOLAX) EC tablet 5 mg  Daily PRN        Placed in \"And\" Linked Group    03/14/25 1519    03/14/25 1519  bisacodyl (DULCOLAX) suppository 10 mg  Daily PRN        Placed in \"And\" Linked Group    03/14/25 1519    03/14/25 1519  oxyCODONE (ROXICODONE) immediate release tablet 5 mg  Every 6 Hours PRN         03/14/25 1519    03/14/25 1519  traMADol (ULTRAM) tablet 50 mg  Every 6 Hours PRN         03/14/25 1519    03/14/25 1519  Lactic Acid, Plasma  STAT         03/14/25 1519    03/14/25 1519  Urinalysis With Microscopic If Indicated (No Culture) - Urine, Clean Catch  STAT         03/14/25 1519    03/14/25 1519  Type & Screen  Once         03/14/25 1519    03/14/25 1518  acetaminophen (TYLENOL) tablet 650 mg  Every 4 Hours PRN,   Status:  Discontinued        Placed in \"Or\" Linked Group    03/14/25 1519    03/14/25 1518  acetaminophen (TYLENOL) 160 MG/5ML oral solution 650 mg  Every 4 Hours PRN        Placed in \"Or\" Linked Group    03/14/25 1519    03/14/25 1518  acetaminophen (TYLENOL) suppository 650 mg  Every 4 Hours PRN,   Status:  Discontinued        Placed in \"Or\" Linked Group    03/14/25 1519    03/14/25 1518  NPO Diet NPO Type: Strict NPO  Diet Effective Now,   Status:  Canceled         03/14/25 1519    03/14/25 1518  CBC Auto Differential  STAT         03/14/25 1519    03/14/25 1518  Comprehensive Metabolic Panel  STAT         03/14/25 1519    03/14/25 1518  Lipase  STAT         03/14/25 1519    03/14/25 1518  Magnesium  STAT         03/14/25 1519    03/14/25 1518  Phosphorus  STAT         03/14/25 1519    03/14/25 1518  Protime-INR  STAT         03/14/25 1519    03/14/25 1518  aPTT  STAT         03/14/25 1519    03/14/25 1517  Intake & Output  Every Shift       03/14/25 1519    03/14/25 1517  Weigh Patient  Once         03/14/25 1519    03/14/25 1517  Insert Peripheral IV  Once         03/14/25 1519    " 03/14/25 1517  Saline Lock & Maintain IV Access  Continuous,   Status:  Canceled         03/14/25 1519    03/14/25 1517  Code Status and Medical Interventions: CPR (Attempt to Resuscitate); Full Support  Continuous,   Status:  Canceled         03/14/25 1519    03/14/25 1517  Remote Cardiac Monitor  Continuous         03/14/25 1519    03/14/25 1517  Maintain IV Access  Continuous,   Status:  Canceled         03/14/25 1519    03/14/25 1517  Activity - Ad Neli  Until Discontinued         03/14/25 1519    03/14/25 1516  sodium chloride 0.9 % flush 10 mL  As Needed         03/14/25 1519    03/14/25 1516  sodium chloride 0.9 % infusion 40 mL  As Needed         03/14/25 1519    03/14/25 1417  Initiate Observation Status  Once         03/14/25 1416    03/14/25 1417  Cardiac Monitoring  Continuous,   Status:  Canceled        Comments: Follow Standing Orders As Outlined in Process Instructions (Open Order Report to View Full Instructions)    03/14/25 1416    03/14/25 1225  Inpatient General Surgery Consult  Once        Specialty:  General Surgery  Provider:  Ryne Muhammad MD    03/14/25 1224    03/14/25 1225  NPO Diet NPO Type: Sips with Meds  Diet Effective Now,   Status:  Canceled         03/14/25 1224    Unscheduled  Oxygen Therapy- Nasal Cannula; 2 LPM  Continuous PRN       03/14/25 2117                     Operative/Procedure Notes (last 24 hours)        Ryne Muhammad MD at 03/14/25 1823          CHOLECYSTECTOMY LAPAROSCOPIC WITH DAVINCI ROBOT  Procedure Report    Patient Name:  Rosa Bhat  YOB: 2000    Date of Surgery:  3/14/2025     Indications: The patient is a 24-year-old female that was referred here from [facility] earlier today with gallstones and cholecystitis.  The decision was made to proceed with a robotic cholecystectomy.    Pre-op Diagnosis: Gallstones and acute cholecystitis    Post-Op Diagnosis: Same    Procedure/CPT® Codes:    Robotic  cholecystectomy    Staff:  Surgeon(s):  Ryne Muhammad MD    Assistant: Renzo Paniagua    Anesthesia: General    Estimated Blood Loss: 10 mL    Implants:    Implant Name Type Inv. Item Serial No.  Lot No. LRB No. Used Action   CARTRDG CLIP LIGAT VASQCLIP [id number] MD/CARRIE STRL - [id number] Implant CARTRDG CLIP LIGAT VASQCLIP [id number] MD/LG STRL  [employer] [id number] N/A 1 Implanted       Specimen:          Specimens       ID Source Type Tests Collected By Collected At Frozen?    A Gallbladder Tissue TISSUE PATHOLOGY EXAM   Ryne Muhammad MD 3/14/25 4818                 Findings: Gallstones and cholecystitis    Complications: None    Description of Procedure: The patient was taken the operating room and placed on the table in supine position.  After induction of general anesthesia, the abdomen was prepped and draped sterilely.  The abdomen was insufflated with a Veress needle and a 5 mm right flank port was placed in the peritoneal cavity using a Visiport.  Two 8 mm da Franki robotic trocars and one 12 mm da Franki robotic trocar were then placed in the abdomen.  The da Franki robot was brought to the table and the robotic arms were docked to the trocars.  The camera and instruments were then placed in the abdomen.  The gallbladder was identified and grasped by the assistant and retracted cephalad.  I then dissected out the gallbladder cystic duct junction and obtained a critical view of safety.  The camera was switched over to firefly mode and we clearly saw the cystic duct coming up into the infundibulum of the gallbladder and identified the common bile duct as well.  The cystic duct was clipped twice proximally once distally with Hem-o-joslyn clips and then divided with the cutting current of the cautery.  We then dissected the cystic artery and this was clipped twice proximally with Hem-o-joslyn clips and divided distally with cautery.  We then dissected the gallbladder free from the  gallbladder fossa with cautery and placed the detached gallbladder in an Endopouch bag.  The operative field was irrigated with sterile saline and suctioned dry.  We appear to have good hemostasis.  At this point remove the instruments from the abdomen and undocked the robotic arms from the trocars.  The gallbladder was removed from the abdomen and the 12 mm port site was closed with a Pablito-Shannon closure device and a 0 Mersilene tie.  After desufflated the abdomen the other ports were removed.  All skin incisions were closed with 4-0 Vicryl subcuticular sutures.  Sterile dressings were then applied.  She was taken the postanesthesia recovery room in stable condition.    Assistant: Renzo Paniagua  was responsible for performing the following activities: Retraction, Suction, Irrigation, Placing Dressing, and Held/Positioned Camera and their skilled assistance was necessary for the success of this case.    Ryne Muhammad MD     Date: 3/14/2025  Time: 19:20 EDT    Electronically signed by Ryne Muhammad MD at 03/14/25 1926

## 2025-03-15 NOTE — PLAN OF CARE
Goal Outcome Evaluation:           Progress: improving  Outcome Evaluation: A&Ox4. Vital signs stable. Medicated for pain x1 this shift. Dr wanted to keep to administer antibiotics, possible discharge tomorrow. Dressings clean, dry, and intact. No complaints or concerns noted. Plan of care ongoing

## 2025-03-15 NOTE — ANESTHESIA POSTPROCEDURE EVALUATION
Patient: Rosa Bhat    Procedure Summary       Date: 03/14/25 Room / Location: [facility] OR 11 / formerly [facility] MAIN OR    Anesthesia Start: 1753 Anesthesia Stop: 1921    Procedure: Robotic cholecystectomy-remove gallbladder with small incisions, camera and robotic instruments (Abdomen) Diagnosis:       Cholecystitis      (Cholecystitis [K81.9])    Surgeons: Ryne Muhammad MD Provider: Ke Salgado CRNA    Anesthesia Type: general ASA Status: 2            Anesthesia Type: general    Vitals  Vitals Value Taken Time   /69 03/14/25 20:11   Temp 36.5 °C (97.7 °F) 03/14/25 19:55   Pulse 85 03/14/25 20:11   Resp 14 03/14/25 20:05   SpO2 93 % 03/14/25 20:11   Vitals shown include unfiled device data.        Post Anesthesia Care and Evaluation    Patient location during evaluation: bedside  Patient participation: complete - patient participated  Level of consciousness: awake  Pain management: adequate    Airway patency: patent  PONV Status: none  Cardiovascular status: acceptable and stable  Respiratory status: acceptable  Hydration status: acceptable

## 2025-03-15 NOTE — PLAN OF CARE
Goal Outcome Evaluation:  Plan of Care Reviewed With: patient, parent        Progress: improving  Outcome Evaluation: returned from OR this shift. alert and oriented. medicated for pain x1. voided and tolerating clear liquids. probable discharge today.  plan of care ongoing.

## 2025-03-16 ENCOUNTER — READMISSION MANAGEMENT (OUTPATIENT)
Dept: CALL CENTER | Facility: HOSPITAL | Age: 25
End: 2025-03-16
Payer: COMMERCIAL

## 2025-03-16 VITALS
HEART RATE: 98 BPM | DIASTOLIC BLOOD PRESSURE: 71 MMHG | WEIGHT: 194 LBS | SYSTOLIC BLOOD PRESSURE: 108 MMHG | HEIGHT: 62 IN | RESPIRATION RATE: 18 BRPM | OXYGEN SATURATION: 98 % | BODY MASS INDEX: 35.7 KG/M2 | TEMPERATURE: 99 F

## 2025-03-16 PROBLEM — K81.9 CHOLECYSTITIS: Status: RESOLVED | Noted: 2025-03-14 | Resolved: 2025-03-16

## 2025-03-16 LAB
ANION GAP SERPL CALCULATED.3IONS-SCNC: 8.9 MMOL/L (ref 5–15)
BASOPHILS # BLD AUTO: 0.03 10*3/MM3 (ref 0–0.2)
BASOPHILS NFR BLD AUTO: 0.2 % (ref 0–1.5)
BUN SERPL-MCNC: 8 MG/DL (ref 6–20)
BUN/CREAT SERPL: 11.8 (ref 7–25)
CALCIUM SPEC-SCNC: 9.1 MG/DL (ref 8.6–10.5)
CHLORIDE SERPL-SCNC: 106 MMOL/L (ref 98–107)
CO2 SERPL-SCNC: 25.1 MMOL/L (ref 22–29)
CREAT SERPL-MCNC: 0.68 MG/DL (ref 0.57–1)
DEPRECATED RDW RBC AUTO: 41.1 FL (ref 37–54)
EGFRCR SERPLBLD CKD-EPI 2021: 124.1 ML/MIN/1.73
EOSINOPHIL # BLD AUTO: 0.01 10*3/MM3 (ref 0–0.4)
EOSINOPHIL NFR BLD AUTO: 0.1 % (ref 0.3–6.2)
ERYTHROCYTE [DISTWIDTH] IN BLOOD BY AUTOMATED COUNT: 12.9 % (ref 12.3–15.4)
GLUCOSE SERPL-MCNC: 91 MG/DL (ref 65–99)
HCT VFR BLD AUTO: 35.3 % (ref 34–46.6)
HGB BLD-MCNC: 11.7 G/DL (ref 12–15.9)
IMM GRANULOCYTES # BLD AUTO: 0.24 10*3/MM3 (ref 0–0.05)
IMM GRANULOCYTES NFR BLD AUTO: 1.4 % (ref 0–0.5)
LYMPHOCYTES # BLD AUTO: 2.43 10*3/MM3 (ref 0.7–3.1)
LYMPHOCYTES NFR BLD AUTO: 14.7 % (ref 19.6–45.3)
MAGNESIUM SERPL-MCNC: 1.9 MG/DL (ref 1.6–2.6)
MCH RBC QN AUTO: 29.1 PG (ref 26.6–33)
MCHC RBC AUTO-ENTMCNC: 33.1 G/DL (ref 31.5–35.7)
MCV RBC AUTO: 87.8 FL (ref 79–97)
MONOCYTES # BLD AUTO: 1.1 10*3/MM3 (ref 0.1–0.9)
MONOCYTES NFR BLD AUTO: 6.6 % (ref 5–12)
NEUTROPHILS NFR BLD AUTO: 12.75 10*3/MM3 (ref 1.7–7)
NEUTROPHILS NFR BLD AUTO: 77 % (ref 42.7–76)
NRBC BLD AUTO-RTO: 0 /100 WBC (ref 0–0.2)
PLATELET # BLD AUTO: 252 10*3/MM3 (ref 140–450)
PMV BLD AUTO: 10.5 FL (ref 6–12)
POTASSIUM SERPL-SCNC: 3.6 MMOL/L (ref 3.5–5.2)
RBC # BLD AUTO: 4.02 10*6/MM3 (ref 3.77–5.28)
SODIUM SERPL-SCNC: 140 MMOL/L (ref 136–145)
WBC NRBC COR # BLD AUTO: 16.56 10*3/MM3 (ref 3.4–10.8)

## 2025-03-16 PROCEDURE — 99024 POSTOP FOLLOW-UP VISIT: CPT | Performed by: SURGERY

## 2025-03-16 PROCEDURE — 94799 UNLISTED PULMONARY SVC/PX: CPT

## 2025-03-16 PROCEDURE — G0378 HOSPITAL OBSERVATION PER HR: HCPCS

## 2025-03-16 PROCEDURE — 83735 ASSAY OF MAGNESIUM: CPT | Performed by: STUDENT IN AN ORGANIZED HEALTH CARE EDUCATION/TRAINING PROGRAM

## 2025-03-16 PROCEDURE — 25010000002 CEFOXITIN PER 1 G: Performed by: SURGERY

## 2025-03-16 PROCEDURE — 80048 BASIC METABOLIC PNL TOTAL CA: CPT | Performed by: STUDENT IN AN ORGANIZED HEALTH CARE EDUCATION/TRAINING PROGRAM

## 2025-03-16 PROCEDURE — 25810000003 LACTATED RINGERS PER 1000 ML: Performed by: HOSPITALIST

## 2025-03-16 PROCEDURE — 85025 COMPLETE CBC W/AUTO DIFF WBC: CPT | Performed by: SURGERY

## 2025-03-16 PROCEDURE — 25010000002 ENOXAPARIN PER 10 MG: Performed by: HOSPITALIST

## 2025-03-16 RX ADMIN — SODIUM CHLORIDE 2000 MG: 9 INJECTION, SOLUTION INTRAVENOUS at 04:54

## 2025-03-16 RX ADMIN — SODIUM CHLORIDE, SODIUM LACTATE, POTASSIUM CHLORIDE, CALCIUM CHLORIDE 100 ML/HR: 20; 30; 600; 310 INJECTION, SOLUTION INTRAVENOUS at 04:54

## 2025-03-16 RX ADMIN — Medication 10 ML: at 09:33

## 2025-03-16 RX ADMIN — SODIUM CHLORIDE 2000 MG: 9 INJECTION, SOLUTION INTRAVENOUS at 09:51

## 2025-03-16 RX ADMIN — SENNOSIDES AND DOCUSATE SODIUM 2 TABLET: 50; 8.6 TABLET ORAL at 09:31

## 2025-03-16 RX ADMIN — POLYETHYLENE GLYCOL 3350 17 G: 17 POWDER, FOR SOLUTION ORAL at 09:31

## 2025-03-16 RX ADMIN — ENOXAPARIN SODIUM 40 MG: 100 INJECTION SUBCUTANEOUS at 09:31

## 2025-03-16 RX ADMIN — Medication 10 ML: at 09:34

## 2025-03-16 NOTE — DISCHARGE SUMMARY
[facility]         HOSPITALIST  DISCHARGE SUMMARY    Patient Name: Rosa Bhat  : 2000  MRN: [id number]    Date of Admission: 3/14/2025  Date of Discharge:  3/16/25  Primary Care Physician: Provider, No Known    Consults       Date and Time Order Name Status Description    3/14/2025 12:24 PM Inpatient General Surgery Consult              Active and Resolved Hospital Problems:  Active Hospital Problems   No active problems to display.      Resolved Hospital Problems    Diagnosis POA    **Cholecystitis [K81.9] Yes       Hospital Course     Hospital Course:  Rosa Bhat is a 24 y.o. female with no known past medical history who presented as a transfer from outside hospital for right upper quadrant abdominal pain.  Found to have acute cholecystitis status post cholecystectomy.  Patient treated with IV cefoxitin while inpatient.  Patient is eating and had a small bowel movement this morning.  Her leukocytosis is improving.  He has been cleared by general surgery for discharge.  Will finish a course of Augmentin at home.  She is medically stable for discharge with follow-up.    DISCHARGE Follow Up Recommendations for labs and diagnostics:   -Follow-up with PCP, check CBC  -Follow-up with general surgery      Day of Discharge     Vital Signs:  Temp:  [98.4 °F (36.9 °C)-99.7 °F (37.6 °C)] 99 °F (37.2 °C)  Heart Rate:  [88-98] 98  Resp:  [16-18] 18  BP: (107-114)/(64-79) 108/71  Physical Exam:   General: No acute distress  Cardiovascular: Regular rate and rhythm  Pulmonary: Normal work of breathing  GI: Abdomen is soft and nontender  Neuro: Alert and oriented    Discharge Details        Discharge Medications        New Medications        Instructions Start Date   amoxicillin-clavulanate 875-125 MG per tablet  Commonly known as: AUGMENTIN   1 tablet, Oral, 2 Times Daily               Allergies   Allergen Reactions    Tuberculin Tests Other (See Comments)     Reacts as if she has TB when she  does not and usually has to do the blood test as well        Discharge Disposition:  Home or Self Care    Diet:  Hospital:  Diet Order   Procedures    Diet: Regular/House; Fluid Consistency: Thin (IDDSI 0)       Discharge Activity:       CODE STATUS:  Code Status and Medical Interventions: CPR (Attempt to Resuscitate); Full   Ordered at: 03/14/25 2117     Code Status (Patient has no pulse and is not breathing):    CPR (Attempt to Resuscitate)     Medical Interventions (Patient has pulse or is breathing):    Full         No future appointments.    Additional Instructions for the Follow-ups that You Need to Schedule       Discharge Follow-up with PCP   As directed       Currently Documented PCP:    Provider, No Known    PCP Phone Number:    [phone]     Follow Up Details: 1 week                Pertinent  and/or Most Recent Results     PROCEDURES:   Cholecystectomy    LAB RESULTS:      Lab 03/16/25  0501 03/15/25  0444 03/14/25  1542   WBC 16.56* 19.55* 11.73*   HEMOGLOBIN 11.7* 12.2 12.2   HEMATOCRIT 35.3 36.6 37.0   PLATELETS 252 228 234   NEUTROS ABS 12.75* 18.01* 7.93*   IMMATURE GRANS (ABS) 0.24* 0.12* 0.03   LYMPHS ABS 2.43 0.70 2.67   MONOS ABS 1.10* 0.69 0.97*   EOS ABS 0.01 0.00 0.10   MCV 87.8 87.1 87.9   LACTATE  --   --  0.6   PROTIME  --  14.8 14.2   APTT  --   --  28.9         Lab 03/16/25  0501 03/15/25  0444 03/14/25  1542   SODIUM 140 134* 138   POTASSIUM 3.6 3.6 3.5   CHLORIDE 106 103 105   CO2 25.1 20.9* 23.6   ANION GAP 8.9 10.1 9.4   BUN 8 8 8   CREATININE 0.68 0.70 0.62   EGFR 124.1 123.3 126.9   GLUCOSE 91 152* 81   CALCIUM 9.1 8.5* 8.9   MAGNESIUM 1.9 1.5* 1.7   PHOSPHORUS  --   --  2.9         Lab 03/15/25  0444 03/14/25  1542   TOTAL PROTEIN 7.2 7.3   ALBUMIN 3.6 3.7   GLOBULIN 3.6 3.6   ALT (SGPT) 30 19   AST (SGOT) 36* 19   BILIRUBIN 0.6 0.6   ALK PHOS 67 71   LIPASE  --  18         Lab 03/15/25  0444 03/14/25  1542   PROTIME 14.8 14.2   INR 1.11 1.05             Lab 03/15/25  0444  03/14/25  1700 03/14/25  1542   IRON 22*  --   --    IRON SATURATION (TSAT) 7*  --   --    TIBC 294*  --   --    TRANSFERRIN 197*  --   --    FERRITIN 68.79  --   --    ABO TYPING  --  O O   RH TYPING  --  Negative Negative   ANTIBODY SCREEN  --   --  Negative         Brief Urine Lab Results  (Last result in the past 365 days)        Color   Clarity   Blood   Leuk Est   Nitrite   Protein   CREAT   Urine HCG        03/14/25 1543 Yellow   Clear   Negative   Moderate (2+)   Negative   Negative           03/14/25 1543               Negative             Microbiology Results (last 10 days)       ** No results found for the last 240 hours. **            No radiology results for the last 7 days                 Labs Pending at Discharge:  Pending Labs       Order Current Status    Tissue Pathology Exam Collected (03/14/25 9489)              Time spent on Discharge including face to face service:  35 minutes    Electronically signed by Brett Zimmerman MD, 03/16/25, 10:06 AM EDT.

## 2025-03-16 NOTE — PROGRESS NOTES
[facility]     Surgery Progress Note      Patient Name: Rosa Bhat  :    2000  MRN:    [id number]  Date of admission:  3/14/2025  Length of Stay: 1 days    Subjective   Feeling much better.  No fevers.  No n/v.  Minimal abd pain.  Wants to go home.    Objective       Current Diet:    Dietary Orders (From admission, onward)       Start     Ordered    25  Snack: Chewing Gum to Increase Saliva Flow & Provide Gas Pain Relief  Once        Comments: Chewing Gum to Increase Saliva Flow & Provide Gas Pain Relief    25  Diet: Regular/House; Fluid Consistency: Thin (IDDSI 0)  Diet Effective Now        References:    Diet Order Definitions   Question Answer Comment   Diets: Regular/House    Fluid Consistency: Thin (IDDSI 0)        25                    Vitals:   Temp:  [98.4 °F (36.9 °C)-99.7 °F (37.6 °C)] 99 °F (37.2 °C)  Heart Rate:  [88-98] 98  Resp:  [16-18] 18  BP: (107-114)/(64-79) 108/71  Physical Exam   Constitutional: appears comfortable, family member present  Respiratory:  breathing not labored, respiratory effort appears normal  Cardiovascular:  heart regular rate  Abdomen:  soft, nondistended, approp incisional tenderness  Skin and subcutaneous tissue:  no jaundice  Musculoskeletal: moving all extremities symmetrically and purposefully  Neurologic:  no obvious motor or sensory deficits, speech clear      LABS:  CBC          3/14/2025    15:42 3/15/2025    04:44 3/16/2025    05:01   CBC   WBC 11.73  19.55  16.56    RBC 4.21  4.20  4.02    Hemoglobin 12.2  12.2  11.7    Hematocrit 37.0  36.6  35.3    MCV 87.9  87.1  87.8    MCH 29.0  29.0  29.1    MCHC 33.0  33.3  33.1    RDW 12.7  12.4  12.9    Platelets 234  228  252      CMP          3/14/2025    15:42 3/15/2025    04:44 3/16/2025    05:01   CMP   Glucose 81  152  91    BUN 8  8  8    Creatinine 0.62  0.70  0.68    EGFR 126.9  123.3  124.1    Sodium 138  134  140    Potassium 3.5  3.6  3.6     Chloride 105  103  106    Calcium 8.9  8.5  9.1    Total Protein 7.3  7.2     Albumin 3.7  3.6     Globulin 3.6  3.6     Total Bilirubin 0.6  0.6     Alkaline Phosphatase 71  67     AST (SGOT) 19  36     ALT (SGPT) 19  30     Albumin/Globulin Ratio 1.0  1.0     BUN/Creatinine Ratio 12.9  11.4  11.8    Anion Gap 9.4  10.1  8.9              Assessment / Plan   Assessment:   Cholecystitis  POD #2 robotic cholecystectomy  Improving satisfactorily  Satisfactory condition for d/c home    Plan:    Can d/c home with 4 days Augmentin BID and f/u w/ Dr. Muhammad in 2 weeks.       Electronically signed by Fam Cornelius MD, 03/16/25, 9:44 AM EDT.

## 2025-03-16 NOTE — PLAN OF CARE
Goal Outcome Evaluation:  Plan of Care Reviewed With: patient, parent        Progress: improving  Outcome Evaluation: alert and oriented. Amulating down hallway with mother. denies having any pain. plans to discharge today 3/16/25

## 2025-03-16 NOTE — NURSING NOTE
Went over discharge teaching with patient and mother, explained where to  antibiotic. Dressings are dry and intact. Encouraged pt to follow up with PCP in 2 weeks. IV removed tip is intact.

## 2025-03-16 NOTE — PLAN OF CARE
Goal Outcome Evaluation:  Plan of Care Reviewed With: patient, parent        Progress: improving  Outcome Evaluation: alert and oriented. ambulated in gandhi this shift. denies pain. passing gas.  plan of care ongoing.

## 2025-03-17 NOTE — OUTREACH NOTE
Prep Survey      Flowsheet Row Responses   [facility] facility patient discharged from? [facility]   Is LACE score < 7 ? Yes   Eligibility Not Eligible   What are the reasons patient is not eligible? Other  [low risk for readmit]   Does the patient have one of the following disease processes/diagnoses(primary or secondary)? Other   Prep survey completed? Yes            JEROD CAMACHO - Registered Nurse

## 2025-03-18 LAB
CYTO UR: NORMAL
LAB AP CASE REPORT: NORMAL
LAB AP CLINICAL INFORMATION: NORMAL
PATH REPORT.FINAL DX SPEC: NORMAL
PATH REPORT.GROSS SPEC: NORMAL

## 2025-03-28 ENCOUNTER — OFFICE VISIT (OUTPATIENT)
Dept: SURGERY | Facility: CLINIC | Age: 25
End: 2025-03-28
Payer: COMMERCIAL

## 2025-03-28 VITALS — WEIGHT: 194 LBS | BODY MASS INDEX: 35.7 KG/M2 | HEIGHT: 62 IN

## 2025-03-28 DIAGNOSIS — K81.0 ACUTE CHOLECYSTITIS: Primary | ICD-10-CM

## 2025-03-28 PROCEDURE — 99024 POSTOP FOLLOW-UP VISIT: CPT | Performed by: SURGERY

## 2025-03-28 NOTE — PROGRESS NOTES
"Chief Complaint  Post-op (Robotic possible open cholecystectomy)    Subjective          Rosa Bhat presents to [facility] GENERAL SURGERY  History of Present Illness    Rosa Bhat is a 25 y.o. female  who presents today for a postoperative visit.     Patient is here for a follow-up after a recent robotic cholecystectomy that was done for acute cholecystitis and gallstones.  She is doing really well.  She had no complaints today.    Past History:  Medical History: has a past medical history of Cholecystitis.   Surgical History: has a past surgical history that includes Cholecystectomy (N/A, 3/14/2025).   Family History: family history includes Alcohol abuse in her paternal grandfather; Diabetes in her maternal grandfather; Hypertension in her father and mother; Kidney disease in her mother and sister.   Social History: reports that she has never smoked. She has never used smokeless tobacco. She reports that she does not drink alcohol and does not use drugs.  Allergies: Tuberculin tests     No current outpatient medications on file.       Physical Exam  She looks good.  Her incisions are healing nicely.  Her abdomen is soft.  Objective     Vital Signs:   Ht 157.5 cm (62\")   Wt 88 kg (194 lb)   BMI 35.48 kg/m²              Assessment and Plan    Diagnoses and all orders for this visit:    1. Acute cholecystitis (Primary)    I will see her back on an as-needed basis.  I have asked her to call me if she were to have further questions or concerns.      "

## (undated) DEVICE — ANTIBACTERIAL UNDYED BRAIDED (POLYGLACTIN 910), SYNTHETIC ABSORBABLE SUTURE: Brand: COATED VICRYL

## (undated) DEVICE — SLV SCD KN/LEN ADJ EXPRSS BLENDED MD 1P/U

## (undated) DEVICE — THE STERILE LIGHT HANDLE COVER IS USED WITH STERIS SURGICAL LIGHTING AND VISUALIZATION SYSTEMS.

## (undated) DEVICE — SYS CLOSE PORTII CARTR/THOMASN XL

## (undated) DEVICE — SOL IRR NACL 0.9PCT 1000ML

## (undated) DEVICE — TISSUE RETRIEVAL SYSTEM: Brand: INZII RETRIEVAL SYSTEM

## (undated) DEVICE — GLV SURG SENSICARE PI ORTHO SZ8 LF STRL

## (undated) DEVICE — ENDOPATH XCEL WITH OPTIVIEW TECHNOLOGY BLADELESS TROCARS WITH STABILITY SLEEVES: Brand: ENDOPATH XCEL OPTIVIEW

## (undated) DEVICE — INTENDED FOR TISSUE SEPARATION, AND OTHER PROCEDURES THAT REQUIRE A SHARP SURGICAL BLADE TO PUNCTURE OR CUT.: Brand: BARD-PARKER ® CARBON RIB-BACK BLADES

## (undated) DEVICE — SOL NACL 0.9PCT 1000ML

## (undated) DEVICE — SYR LL TP 10ML STRL

## (undated) DEVICE — DAVINCI-LF: Brand: MEDLINE INDUSTRIES, INC.

## (undated) DEVICE — STRIP CLS WND SUTURESTRIP/PLS 0.5X4IN TP1103

## (undated) DEVICE — SEAL

## (undated) DEVICE — 2, DISPOSABLE SUCTION/IRRIGATOR WITHOUT DISPOSABLE TIP: Brand: STRYKEFLOW

## (undated) DEVICE — 40585 XL ADVANCED TRENDELENBURG POSITIONING KIT: Brand: 40585 XL ADVANCED TRENDELENBURG POSITIONING KIT

## (undated) DEVICE — REDUCER: Brand: ENDOWRIST

## (undated) DEVICE — FLTR SMOKE EVAC SEECLEAR CHARCOAL 8L/MIN

## (undated) DEVICE — ARM DRAPE

## (undated) DEVICE — SUT MERSILENE POLYSTR CT1 BR 0 75CM GRN

## (undated) DEVICE — PENCL ES MEGADINE EZ/CLEAN BUTN W/HOLSTR 10FT

## (undated) DEVICE — LAPAROVUE VISIBILITY SYSTEM LAPAROSCOPIC SOLUTIONS: Brand: LAPAROVUE

## (undated) DEVICE — STERILE POLYISOPRENE POWDER-FREE SURGICAL GLOVES WITH EMOLLIENT COATING: Brand: PROTEXIS

## (undated) DEVICE — NON-WOVEN ADHESIVE WOUND DRESSING: Brand: PRIMAPORE ADHESIVE WOUND DRSG 7.2*5CM

## (undated) DEVICE — SHEET,DRAPE,70X85,STERILE: Brand: MEDLINE